# Patient Record
Sex: FEMALE | Race: WHITE | Employment: UNEMPLOYED | URBAN - METROPOLITAN AREA
[De-identification: names, ages, dates, MRNs, and addresses within clinical notes are randomized per-mention and may not be internally consistent; named-entity substitution may affect disease eponyms.]

---

## 2017-03-10 PROBLEM — I10 HTN (HYPERTENSION): Status: ACTIVE | Noted: 2017-03-10

## 2017-04-25 ENCOUNTER — HOSPITAL ENCOUNTER (OUTPATIENT)
Dept: GENERAL RADIOLOGY | Age: 57
Discharge: HOME | End: 2017-04-25
Attending: INTERNAL MEDICINE | Admitting: INTERNAL MEDICINE

## 2017-04-25 ENCOUNTER — HOSPITAL ENCOUNTER (OUTPATIENT)
Dept: GENERAL RADIOLOGY | Age: 57
Discharge: HOME | End: 2017-04-25
Attending: FAMILY MEDICINE | Admitting: FAMILY MEDICINE

## 2017-04-25 LAB
ALBUMIN SERPL-MCNC: 4.3 GM/DL (ref 3.4–5)
ANION GAP SERPL CALCULATED.3IONS-SCNC: 19 MMOL/L (ref 4–16)
BASOPHILS ABSOLUTE: 0 K/CU MM
BASOPHILS RELATIVE PERCENT: 0.5 % (ref 0–1)
BUN BLDV-MCNC: 99 MG/DL (ref 6–23)
CALCIUM SERPL-MCNC: 9.9 MG/DL (ref 8.3–10.6)
CHLORIDE BLD-SCNC: 102 MMOL/L (ref 99–110)
CO2: 21 MMOL/L (ref 21–32)
CREAT SERPL-MCNC: 6.2 MG/DL (ref 0.6–1.1)
DIFFERENTIAL TYPE: ABNORMAL
EOSINOPHILS ABSOLUTE: 0.1 K/CU MM
EOSINOPHILS RELATIVE PERCENT: 2.7 % (ref 0–3)
GFR AFRICAN AMERICAN: 8 ML/MIN/1.73M2
GFR NON-AFRICAN AMERICAN: 7 ML/MIN/1.73M2
GLUCOSE BLD-MCNC: 88 MG/DL (ref 70–140)
HCT VFR BLD CALC: 35.7 % (ref 37–47)
HEMOGLOBIN: 10.4 GM/DL (ref 12.5–16)
IMMATURE NEUTROPHIL %: 0.2 % (ref 0–0.43)
LYMPHOCYTES ABSOLUTE: 1.2 K/CU MM
LYMPHOCYTES RELATIVE PERCENT: 29.8 % (ref 24–44)
MCH RBC QN AUTO: 26.3 PG (ref 27–31)
MCHC RBC AUTO-ENTMCNC: 29.1 % (ref 32–36)
MCV RBC AUTO: 90.4 FL (ref 78–100)
MONOCYTES ABSOLUTE: 0.3 K/CU MM
MONOCYTES RELATIVE PERCENT: 7 % (ref 0–4)
NUCLEATED RBC %: 0 %
PDW BLD-RTO: 17.2 % (ref 11.7–14.9)
PHOSPHORUS: 5.1 MG/DL (ref 2.5–4.9)
PLATELET # BLD: 152 K/CU MM (ref 140–440)
PMV BLD AUTO: 9.2 FL (ref 7.5–11.1)
POTASSIUM SERPL-SCNC: 4.7 MMOL/L (ref 3.5–5.1)
RBC # BLD: 3.95 M/CU MM (ref 4.2–5.4)
SEGMENTED NEUTROPHILS ABSOLUTE COUNT: 2.5 K/CU MM
SEGMENTED NEUTROPHILS RELATIVE PERCENT: 59.8 % (ref 36–66)
SODIUM BLD-SCNC: 142 MMOL/L (ref 135–145)
TOTAL IMMATURE NEUTOROPHIL: 0.01 K/CU MM
TOTAL NUCLEATED RBC: 0 K/CU MM
TSH HIGH SENSITIVITY: 0.43 UIU/ML (ref 0.27–4.2)
WBC # BLD: 4.1 K/CU MM (ref 4–10.5)

## 2017-05-01 PROBLEM — E83.39 HYPERPHOSPHATEMIA: Status: ACTIVE | Noted: 2017-05-01

## 2017-07-06 ENCOUNTER — HOSPITAL ENCOUNTER (OUTPATIENT)
Dept: GENERAL RADIOLOGY | Age: 57
Discharge: HOME | End: 2017-07-06
Attending: INTERNAL MEDICINE | Admitting: INTERNAL MEDICINE

## 2017-07-06 LAB
ALBUMIN SERPL-MCNC: 3.8 GM/DL (ref 3.4–5)
ALP BLD-CCNC: 107 IU/L (ref 40–128)
ALT SERPL-CCNC: 15 U/L (ref 10–40)
ANION GAP SERPL CALCULATED.3IONS-SCNC: 18 MMOL/L (ref 4–16)
AST SERPL-CCNC: 18 IU/L (ref 15–37)
BASOPHILS ABSOLUTE: 0 K/CU MM
BASOPHILS RELATIVE PERCENT: 0.4 % (ref 0–1)
BILIRUB SERPL-MCNC: 0.4 MG/DL (ref 0–1)
BUN BLDV-MCNC: 94 MG/DL (ref 6–23)
CALCIUM SERPL-MCNC: 9.4 MG/DL (ref 8.3–10.6)
CHLORIDE BLD-SCNC: 98 MMOL/L (ref 99–110)
CO2: 22 MMOL/L (ref 21–32)
CREAT SERPL-MCNC: 7.3 MG/DL (ref 0.6–1.1)
DIFFERENTIAL TYPE: ABNORMAL
EOSINOPHILS ABSOLUTE: 0.2 K/CU MM
EOSINOPHILS RELATIVE PERCENT: 3.1 % (ref 0–3)
GFR AFRICAN AMERICAN: 7 ML/MIN/1.73M2
GFR NON-AFRICAN AMERICAN: 6 ML/MIN/1.73M2
GLUCOSE BLD-MCNC: 206 MG/DL (ref 70–140)
HCT VFR BLD CALC: 31.8 % (ref 37–47)
HEMOGLOBIN: 9.5 GM/DL (ref 12.5–16)
IMMATURE NEUTROPHIL %: 0.4 % (ref 0–0.43)
LYMPHOCYTES ABSOLUTE: 1 K/CU MM
LYMPHOCYTES RELATIVE PERCENT: 18.8 % (ref 24–44)
MCH RBC QN AUTO: 26.8 PG (ref 27–31)
MCHC RBC AUTO-ENTMCNC: 29.9 % (ref 32–36)
MCV RBC AUTO: 89.8 FL (ref 78–100)
MONOCYTES ABSOLUTE: 0.4 K/CU MM
MONOCYTES RELATIVE PERCENT: 6.3 % (ref 0–4)
NUCLEATED RBC %: 0 %
PDW BLD-RTO: 18.5 % (ref 11.7–14.9)
PHOSPHORUS: 5 MG/DL (ref 2.5–4.9)
PLATELET # BLD: 163 K/CU MM (ref 140–440)
PMV BLD AUTO: 9.5 FL (ref 7.5–11.1)
POTASSIUM SERPL-SCNC: 5.2 MMOL/L (ref 3.5–5.1)
RBC # BLD: 3.54 M/CU MM (ref 4.2–5.4)
SEGMENTED NEUTROPHILS ABSOLUTE COUNT: 3.9 K/CU MM
SEGMENTED NEUTROPHILS RELATIVE PERCENT: 71 % (ref 36–66)
SODIUM BLD-SCNC: 138 MMOL/L (ref 135–145)
TOTAL IMMATURE NEUTOROPHIL: 0.02 K/CU MM
TOTAL NUCLEATED RBC: 0 K/CU MM
TOTAL PROTEIN: 7.3 GM/DL (ref 6.4–8.2)
WBC # BLD: 5.5 K/CU MM (ref 4–10.5)

## 2017-07-21 ENCOUNTER — HOSPITAL ENCOUNTER (OUTPATIENT)
Dept: GENERAL RADIOLOGY | Age: 57
Discharge: HOME | End: 2017-07-21
Attending: INTERNAL MEDICINE | Admitting: INTERNAL MEDICINE

## 2017-07-21 DIAGNOSIS — R06.02 BREATH SHORTNESS: ICD-10-CM

## 2017-07-21 LAB
HBV SURFACE AB TITR SER: <3.5 {TITER}
HEPATITIS B CORE IGM ANTIBODY: NON REACTIVE
HEPATITIS B SURFACE ANTIGEN: NON REACTIVE

## 2017-12-06 ENCOUNTER — HOSPITAL ENCOUNTER (OUTPATIENT)
Dept: OTHER | Age: 57
Discharge: HOME | End: 2017-12-06
Attending: INTERNAL MEDICINE | Admitting: INTERNAL MEDICINE

## 2017-12-08 LAB
HAV AB SERPL IA-ACNC: POSITIVE
MS ALPHA-FETOPROTEIN: 4

## 2017-12-11 ENCOUNTER — HOSPITAL ENCOUNTER (OUTPATIENT)
Dept: OTHER | Age: 57
Discharge: HOME | End: 2017-12-11
Attending: INTERNAL MEDICINE | Admitting: INTERNAL MEDICINE

## 2017-12-11 LAB
ANION GAP SERPL CALCULATED.3IONS-SCNC: 16 MMOL/L (ref 4–16)
BUN BLDV-MCNC: 52 MG/DL (ref 6–23)
CALCIUM SERPL-MCNC: 8.8 MG/DL (ref 8.3–10.6)
CHLORIDE BLD-SCNC: 99 MMOL/L (ref 99–110)
CO2: 23 MMOL/L (ref 21–32)
CREAT SERPL-MCNC: 5.7 MG/DL (ref 0.6–1.1)
GFR AFRICAN AMERICAN: 9 ML/MIN/1.73M2
GFR NON-AFRICAN AMERICAN: 8 ML/MIN/1.73M2
GLUCOSE BLD-MCNC: 243 MG/DL (ref 70–99)
POTASSIUM SERPL-SCNC: 4.1 MMOL/L (ref 3.5–5.1)
SODIUM BLD-SCNC: 138 MMOL/L (ref 135–145)

## 2018-05-16 ENCOUNTER — HOSPITAL ENCOUNTER (OUTPATIENT)
Dept: OTHER | Age: 58
Discharge: HOME | End: 2018-05-16
Attending: INTERNAL MEDICINE | Admitting: INTERNAL MEDICINE

## 2018-05-16 LAB — APTT: 52 SECONDS (ref 21.2–33)

## 2018-06-06 ENCOUNTER — HOSPITAL ENCOUNTER (OUTPATIENT)
Dept: OTHER | Age: 58
Discharge: HOME | End: 2018-06-06
Attending: INTERNAL MEDICINE | Admitting: INTERNAL MEDICINE

## 2018-06-06 LAB — APTT: 56.9 SECONDS (ref 21.2–33)

## 2018-07-02 ENCOUNTER — HOSPITAL ENCOUNTER (OUTPATIENT)
Dept: OTHER | Age: 58
Discharge: HOME | End: 2018-07-02
Attending: INTERNAL MEDICINE | Admitting: INTERNAL MEDICINE

## 2018-07-02 LAB — APTT: 56.8 SECONDS (ref 21.2–33)

## 2018-08-01 ENCOUNTER — HOSPITAL ENCOUNTER (OUTPATIENT)
Dept: OTHER | Age: 58
Discharge: HOME | End: 2018-08-01
Attending: INTERNAL MEDICINE | Admitting: INTERNAL MEDICINE

## 2018-08-01 LAB — APTT: 54.5 SECONDS (ref 21.2–33)

## 2018-11-05 PROBLEM — Z94.89 TRANSPLANT RECIPIENT: Status: ACTIVE | Noted: 2018-11-05

## 2018-11-05 PROBLEM — Z94.4 LIVER TRANSPLANT STATUS (HCC): Status: ACTIVE | Noted: 2018-11-05

## 2018-11-05 PROBLEM — Z94.0 KIDNEY TRANSPLANT STATUS: Status: ACTIVE | Noted: 2018-11-05

## 2018-11-05 PROBLEM — R73.9 HYPERGLYCEMIA: Status: ACTIVE | Noted: 2018-11-05

## 2018-11-05 PROBLEM — N18.1 CHRONIC KIDNEY DISEASE (CKD) STAGE G1/A1, GLOMERULAR FILTRATION RATE (GFR) EQUAL TO OR GREATER THAN 90 ML/MIN/1.73 SQUARE METER AND ALBUMINURIA CREATININE RATIO LESS THAN 30 MG/G: Status: ACTIVE | Noted: 2018-11-05

## 2019-02-05 ENCOUNTER — HOSPITAL ENCOUNTER (OUTPATIENT)
Dept: PHYSICAL THERAPY | Age: 59
Setting detail: THERAPIES SERIES
Discharge: HOME | End: 2019-02-05
Payer: COMMERCIAL

## 2019-02-05 PROCEDURE — 97162 PT EVAL MOD COMPLEX 30 MIN: CPT

## 2019-02-05 PROCEDURE — 97110 THERAPEUTIC EXERCISES: CPT

## 2019-02-05 ASSESSMENT — PAIN SCALES - GENERAL: PAINLEVEL_OUTOF10: 3

## 2019-02-05 ASSESSMENT — PAIN DESCRIPTION - FREQUENCY: FREQUENCY: CONTINUOUS

## 2019-02-05 ASSESSMENT — PAIN DESCRIPTION - LOCATION: LOCATION: BACK

## 2019-02-05 ASSESSMENT — PAIN DESCRIPTION - DESCRIPTORS: DESCRIPTORS: ACHING;SHARP;TIGHTNESS

## 2019-02-05 ASSESSMENT — PAIN DESCRIPTION - PAIN TYPE: TYPE: CHRONIC PAIN

## 2019-02-11 ENCOUNTER — HOSPITAL ENCOUNTER (OUTPATIENT)
Dept: PHYSICAL THERAPY | Age: 59
Setting detail: THERAPIES SERIES
Discharge: HOME | End: 2019-02-11
Payer: COMMERCIAL

## 2019-02-11 PROCEDURE — 97110 THERAPEUTIC EXERCISES: CPT

## 2019-02-11 PROCEDURE — G0283 ELEC STIM OTHER THAN WOUND: HCPCS

## 2019-02-14 ENCOUNTER — HOSPITAL ENCOUNTER (OUTPATIENT)
Dept: PHYSICAL THERAPY | Age: 59
Discharge: HOME | End: 2019-02-14

## 2019-02-14 NOTE — FLOWSHEET NOTE
Physical Therapy  Cancellation/No-show Note  Patient Name:  Julia Robertson  :  1960   Date:  2019  Cancelled visits to date: 1  No-shows to date: 0    For today's appointment patient:  [x]  Cancelled  []  Rescheduled appointment  []  No-show     Reason given by patient:  []  Patient ill  []  Conflicting appointment  []  No transportation    []  Conflict with work  []  No reason given  [x]  Other:     Comments:  Over slept and won't make it in time.     Electronically signed by:  Faustina Valencia PTA, 2019, 11:05 AM

## 2019-02-18 ENCOUNTER — HOSPITAL ENCOUNTER (OUTPATIENT)
Dept: PHYSICAL THERAPY | Age: 59
Setting detail: THERAPIES SERIES
Discharge: HOME | End: 2019-02-18
Payer: COMMERCIAL

## 2019-02-18 PROCEDURE — G0283 ELEC STIM OTHER THAN WOUND: HCPCS

## 2019-02-18 PROCEDURE — 97110 THERAPEUTIC EXERCISES: CPT

## 2019-02-21 ENCOUNTER — HOSPITAL ENCOUNTER (OUTPATIENT)
Dept: PHYSICAL THERAPY | Age: 59
Setting detail: THERAPIES SERIES
Discharge: HOME | End: 2019-02-21
Payer: COMMERCIAL

## 2019-02-21 PROCEDURE — G0283 ELEC STIM OTHER THAN WOUND: HCPCS

## 2019-02-21 PROCEDURE — 97110 THERAPEUTIC EXERCISES: CPT

## 2019-02-21 PROCEDURE — 97112 NEUROMUSCULAR REEDUCATION: CPT

## 2019-02-25 ENCOUNTER — HOSPITAL ENCOUNTER (OUTPATIENT)
Dept: PHYSICAL THERAPY | Age: 59
Setting detail: THERAPIES SERIES
Discharge: HOME | End: 2019-02-25
Payer: COMMERCIAL

## 2019-02-25 PROCEDURE — G0283 ELEC STIM OTHER THAN WOUND: HCPCS

## 2019-02-25 PROCEDURE — 97112 NEUROMUSCULAR REEDUCATION: CPT

## 2019-02-25 PROCEDURE — 97110 THERAPEUTIC EXERCISES: CPT

## 2019-02-28 ENCOUNTER — HOSPITAL ENCOUNTER (OUTPATIENT)
Dept: PHYSICAL THERAPY | Age: 59
Setting detail: THERAPIES SERIES
Discharge: HOME | End: 2019-02-28
Payer: COMMERCIAL

## 2019-02-28 PROCEDURE — 97035 APP MDLTY 1+ULTRASOUND EA 15: CPT

## 2019-02-28 PROCEDURE — 97140 MANUAL THERAPY 1/> REGIONS: CPT

## 2019-02-28 PROCEDURE — 97110 THERAPEUTIC EXERCISES: CPT

## 2019-03-04 ENCOUNTER — HOSPITAL ENCOUNTER (OUTPATIENT)
Dept: PHYSICAL THERAPY | Age: 59
Setting detail: THERAPIES SERIES
Discharge: HOME | End: 2019-03-04
Payer: COMMERCIAL

## 2019-03-04 PROBLEM — E11.9 DM (DIABETES MELLITUS) (HCC): Status: ACTIVE | Noted: 2019-03-04

## 2019-03-04 PROCEDURE — 97110 THERAPEUTIC EXERCISES: CPT

## 2019-03-04 PROCEDURE — 97140 MANUAL THERAPY 1/> REGIONS: CPT

## 2019-03-04 PROCEDURE — 97035 APP MDLTY 1+ULTRASOUND EA 15: CPT

## 2019-03-07 ENCOUNTER — HOSPITAL ENCOUNTER (OUTPATIENT)
Dept: PHYSICAL THERAPY | Age: 59
Discharge: HOME | End: 2019-03-07

## 2019-03-07 NOTE — FLOWSHEET NOTE
Physical Therapy  Cancellation/No-show Note  Patient Name:  Mimi Burns  :  1960   Date:  3/7/2019  Cancelled visits to date: 1  No-shows to date: 0    For today's appointment patient:  [x]  Cancelled  []  Rescheduled appointment  []  No-show     Reason given by patient:  []  Patient ill  [x]  Conflicting appointment  []  No transportation    []  Conflict with work  []  No reason given  []  Other:     Comments:      Electronically signed by:  Keri Tijerina PTA, 3/7/2019, 10:55 AM

## 2019-03-11 ENCOUNTER — HOSPITAL ENCOUNTER (OUTPATIENT)
Dept: PHYSICAL THERAPY | Age: 59
Discharge: HOME | End: 2019-03-11

## 2019-03-14 ENCOUNTER — HOSPITAL ENCOUNTER (OUTPATIENT)
Dept: PHYSICAL THERAPY | Age: 59
Discharge: HOME | End: 2019-03-14

## 2019-03-14 NOTE — FLOWSHEET NOTE
Paulina Parada   Physical Therapist   Cancelled   Flowsheet Note   Signed   Date of Service:  3/7/2019 10:55 AM          Related encounter: PT TREATMENT 45 MINUTES from 3/7/2019 in [place] [address] Physical Therapy         Signed                 Show:Clear all  []Manual[x]Template[x]Copied    Added by:  [x]Evie Beth      []Ying for details            Physical Therapy  Cancellation/No-show Note  Patient Name:  Mimi Billsr  :  1960   Date:  3/7/2019  Cancelled visits to date: 3  No-shows to date: 0     For today's appointment patient:  [x]  Cancelled  []  Rescheduled appointment  []  No-show     Reason given by patient:  []  Patient ill  []  Conflicting appointment  []  No transportation                  []  Conflict with work  [x]  No reason given  []  Other:                Comments:       Electronically signed by:  Abimbola Beavers

## 2019-03-26 ENCOUNTER — HOSPITAL ENCOUNTER (OUTPATIENT)
Dept: PHYSICAL THERAPY | Age: 59
Setting detail: THERAPIES SERIES
Discharge: HOME | End: 2019-03-26
Payer: COMMERCIAL

## 2019-03-26 PROCEDURE — G0283 ELEC STIM OTHER THAN WOUND: HCPCS

## 2019-03-26 PROCEDURE — 97112 NEUROMUSCULAR REEDUCATION: CPT

## 2019-03-26 PROCEDURE — 97110 THERAPEUTIC EXERCISES: CPT

## 2019-03-28 ENCOUNTER — HOSPITAL ENCOUNTER (OUTPATIENT)
Dept: PHYSICAL THERAPY | Age: 59
Setting detail: THERAPIES SERIES
Discharge: HOME | End: 2019-03-28
Payer: COMMERCIAL

## 2019-03-28 PROCEDURE — 97112 NEUROMUSCULAR REEDUCATION: CPT

## 2019-03-28 PROCEDURE — 97110 THERAPEUTIC EXERCISES: CPT

## 2019-03-28 PROCEDURE — G0283 ELEC STIM OTHER THAN WOUND: HCPCS

## 2019-04-01 LAB
CHOLESTEROL, TOTAL: 149 MG/DL
CHOLESTEROL/HDL RATIO: NORMAL
HDLC SERPL-MCNC: 52 MG/DL (ref 35–70)
LDL CHOLESTEROL CALCULATED: 65 MG/DL (ref 0–160)
TRIGL SERPL-MCNC: 162 MG/DL
VLDLC SERPL CALC-MCNC: NORMAL MG/DL

## 2019-04-08 ENCOUNTER — HOSPITAL ENCOUNTER (OUTPATIENT)
Dept: PHYSICAL THERAPY | Age: 59
Setting detail: THERAPIES SERIES
Discharge: HOME | End: 2019-04-08
Payer: COMMERCIAL

## 2019-04-08 PROCEDURE — 97110 THERAPEUTIC EXERCISES: CPT

## 2019-04-08 NOTE — FLOWSHEET NOTE
Outpatient Physical Therapy  [place]           [x] Phone: [phone]   Fax: [phone]  [facility] [place]           [] Phone: [phone]   Fax: [phone]        Physical Therapy Daily Treatment Note  Date:  2019    Patient Name:  Mimi Painter    :  1960  MRN: [id number]  Restrictions/Precautions: Other position/activity restrictions: No formal restrictions. NO LUMBAR MECHANICAL TRACTION D/T RECENT KIDNEY/LIVER TRANSPLANT  Diagnosis:   Diagnosis: Low back pain  Date of Injury/Surgery:  chronic  Treatment Diagnosis: Treatment Diagnosis: core and postural weakness, back pain    Insurance/Certification information: PT Insurance Information: Denise   Referring Physician:  Referring Practitioner: Meagan Lyn  Next Doctor Visit:  uncertain  Plan of care signed (Y/N):  Y  Outcome Measure: Osw Eval , 10th visit   Visit# / total visits:  10/12   Pain level: 1/10 back; abdomen 2/10  POC Date Range:   19 - 19 (date extended for purposes of completing POC/had missed visits.)       Goals:          Long term goals  Time Frame for Long term goals : In 6 weeks, patient will  Long term goal 1: demonstrate compliance and independence w/HEP. - inconsistent 19  Long term goal 2: score 1-9 on Oswestry LBPDQ as indication of significant functional improvement. - Improved/Unmet 19  Long term goal 3: report ability to sleep at least 4 hrs/night w/o waking w/back pain. - Met 3/26/19  Long term goal 4: report ability to roll over in bed w/o having LBP. Improved/Unmet 19    Summary of Evaluation: Assessment: Patient is a 59 y.o. female w/DX LBP and DDD. Pt reports hx LBP, however uncertain if it was related to PKD, liver and kidney failure which caused additional weight gain. Transplant was completed in 2018 and continues to have back pain. Pain is worse as the day goes on and turning over in bed, bending forward.   Today, patient presents w/core weakness, palpable tenderness over lumbar spine and right paraspinals, general deconditioning and will benefit from physical therapy. Subjective:  Pt has been busy doing her taxes; missed her last appointment. Inconsistent w/HEP. Overall, feels back is improving. Any changes in Ambulatory Summary Sheet? None    Objective:  No back pain, mild abdominal soreness  Gait is non-antalgic  Mild LBP discomfort w/exercise      Exercises: (No more than 4 columns)   Exercise/Equipment 2/28/19 #6 3/4/2019 (7) 3/26/19 (8) 3/28/19 (9) 4/08/19 (10)         Mod Osw next visit Mod Osw  11/50 (22%)   WARM UP                           TABLE Hold d/t abdominal discomfort from procedure yesterday.  HOLD Lumbar stabilization:    SB: pelvic rocks f/b;s/s;cw/ccw x 1-2 min ea    SB: marching x 1 min    SB  Shoulder flexion w/1# hand wts  2 x 10    SB:  DKTC with  OH arms holding a 2# MB 3 ct 2 x 10    Bridging:   3 ct 2 x 10    Hook lying trunk rotation  3 x 10    SKTC  3 x 20 sec holds Lumbar stabilization:    SB: pelvic rocks f/b;s/s;cw/ccw x 1-2 min ea    SB: marching x 1 min    SB  Shoulder flexion w/2# hand wts  2 x 10    SB:  DKTC with  OH arms holding a 2# MB 3 ct 2 x 10    Bridging w/red TB outward resistance at knees   3 ct 2 x 10    Hook lying trunk rotation  3 x 10    SKTC  3 x 20 sec holds    Supine clamshell #1 w/RTB  2 x 10 Lumbar stabilization:    SB: pelvic rocks f/b;s/s;cw/ccw x 1-2 min ea    SB: marching x 1 min    SB  Shoulder flexion w/2# hand wts  2 x 15    SB  Shoulder forward punch  W/2# hand wt  2 x 15    SB:  DKTC with  OH arms holding a 2# MB 3 ct 2 x 10    SB:  DKTC w/OH arms holding 2# MB w/oblique rotation  1 x 10    Bridging w/red TB outward resistance at knees   3 ct 2 x 10    Hook lying trunk rotation  3 x 10    SKTC  3 x 20 sec holds    Supine clamshell #1 w/RTB  3 x 10    Nu Step WL4 x 7 min S7/A7  fatigue Nu Step WL3 x 10 min S7/A7 Nu Step WL3 x 10 min S7/A7 Nu Step WL3 x 10 min S7/A7 Nu Step WL3 x 10 min S7/A7 Electronically signed by:  Ezequiel Aceves, PT 4/8/2019, 3:51 PM

## 2019-04-10 ENCOUNTER — HOSPITAL ENCOUNTER (OUTPATIENT)
Dept: PHYSICAL THERAPY | Age: 59
Setting detail: THERAPIES SERIES
Discharge: HOME | End: 2019-04-10
Payer: COMMERCIAL

## 2019-04-10 PROCEDURE — 97110 THERAPEUTIC EXERCISES: CPT

## 2019-04-10 NOTE — FLOWSHEET NOTE
Outpatient Physical Therapy  [place]           [x] Phone: [phone]   Fax: [phone]  [facility]           [] Phone: [phone]   Fax: [phone]        Physical Therapy Daily Treatment Note  Date:  4/10/2019    Patient Name:  Mimi Painter    :  1960  MRN: [id number]  Restrictions/Precautions: Other position/activity restrictions: No formal restrictions. NO LUMBAR MECHANICAL TRACTION D/T RECENT KIDNEY/LIVER TRANSPLANT  Diagnosis:   Diagnosis: Low back pain  Date of Injury/Surgery:  chronic  Treatment Diagnosis: Treatment Diagnosis: core and postural weakness, back pain    Insurance/Certification information: PT Insurance Information: Denise   Referring Physician:  Referring Practitioner: Meagan Lyn  Next Doctor Visit:  uncertain  Plan of care signed (Y/N):  Y  Outcome Measure: Osw Eval , 10th visit   Visit# / total visits:     Pain level: 1/10 back; abdomen 2-3/10  POC Date Range:   19 - 19 (date extended for purposes of completing POC/had missed visits.)       Goals:          Long term goals  Time Frame for Long term goals : In 6 weeks, patient will  Long term goal 1: demonstrate compliance and independence w/HEP. - inconsistent 19  Long term goal 2: score 1-9 on Oswestry LBPDQ as indication of significant functional improvement. - Improved/Unmet 19  Long term goal 3: report ability to sleep at least 4 hrs/night w/o waking w/back pain. - Met 3/26/19  Long term goal 4: report ability to roll over in bed w/o having LBP. Improved/Unmet 19    Summary of Evaluation: Assessment: Patient is a 59 y.o. female w/DX LBP and DDD. Pt reports hx LBP, however uncertain if it was related to PKD, liver and kidney failure which caused additional weight gain. Transplant was completed in 2018 and continues to have back pain. Pain is worse as the day goes on and turning over in bed, bending forward.   Today, patient presents w/core weakness, palpable tenderness over lumbar spine and right paraspinals, general deconditioning and will benefit from physical therapy. Subjective:  Pt states she continues to have discomfort with the therapy and if she coughs or sneezes. Any changes in Ambulatory Summary Sheet? None    Objective:  Pt with a slight increase in abdominal pain with the treatment. Transfers from supine to sit are still uncomfortable.         Exercises: (No more than 4 columns)   Exercise/Equipment 3/26/19 (8) 3/28/19 (9) 4/08/19 (10) 4/10/19  #11       Mod Osw next visit Mod Osw  11/50 (22%)    WARM UP                        TABLE Lumbar stabilization:    SB: pelvic rocks f/b;s/s;cw/ccw x 1-2 min ea    SB: marching x 1 min    SB  Shoulder flexion w/1# hand wts  2 x 10    SB:  DKTC with  OH arms holding a 2# MB 3 ct 2 x 10    Bridging:   3 ct 2 x 10    Hook lying trunk rotation  3 x 10    SKTC  3 x 20 sec holds Lumbar stabilization:    SB: pelvic rocks f/b;s/s;cw/ccw x 1-2 min ea    SB: marching x 1 min    SB  Shoulder flexion w/2# hand wts  2 x 10    SB:  DKTC with  OH arms holding a 2# MB 3 ct 2 x 10    Bridging w/red TB outward resistance at knees   3 ct 2 x 10    Hook lying trunk rotation  3 x 10    SKTC  3 x 20 sec holds    Supine clamshell #1 w/RTB  2 x 10 Lumbar stabilization:    SB: pelvic rocks f/b;s/s;cw/ccw x 1-2 min ea    SB: marching x 1 min    SB  Shoulder flexion w/2# hand wts  2 x 15    SB  Shoulder forward punch  W/2# hand wt  2 x 15    SB:  DKTC with  OH arms holding a 2# MB 3 ct 2 x 10    SB:  DKTC w/OH arms holding 2# MB w/oblique rotation  1 x 10    Bridging w/red TB outward resistance at knees   3 ct 2 x 10    Hook lying trunk rotation  3 x 10    SKTC  3 x 20 sec holds    Supine clamshell #1 w/RTB  3 x 10 Lumbar stabilization:    SB: pelvic rocks f/b;s/s;cw/ccw x 1 min ea    SB: marching x 1 min    SB  Shoulder flexion w/2# hand wts  2 x 15    SB  Shoulder forward punch  W/2# hand wt  2 x 15    SB:  DKTC with  OH arms holding a 2# MB 3 ct 2 x 10    SB:  DKTC w/OH arms holding 2# MB w/oblique rotation  1 x 10    Bridging w/red TB outward resistance at knees   3 ct 2 x 10    Nu Step WL3 x 10 min S7/A7 Nu Step WL3 x 10 min S7/A7 Nu Step WL3 x 10 min S7/A7 Nu Step WL3 x 10 min S7/A7   Shuttle    2 cords 2 x 15                           STANDING       Pallof press with lunge    2 x 10 YTB R/L                                                  PROPRIOCEPTION                                          MODALITIES IFC with HP to Pt's matthew on B LB, Pt in prone  Output 22  15 min  MH applied to LB IFC with HP to Pt's matthew on B LB, Pt in prone  Output 22  15 min  MH applied to LB                       Other Therapeutic Activities/Education:    Resume HEP as tolerated w/consideration for recent biliary stent removal.    Home Exercise Program:    PPT  Hook lying trunk rotation  SKTC as able  Bridges w/RTB  Supine clamshell #1 w/RTB    Manual Treatments:    N/A    Modalities:  US (see above)      Communication with other providers:    N/A    Assessment:  Pt still with more abdominal pain post treatment to 3/10. Plan for Next Session:   Progress lumbar stabilization/core strengthening as tolerated. Clamshell #1/#2  Shuttle  pallof press w/lunge. Advance standing functional strengthening as able. STM/modalities prn pain management. Time In / Time Out:  1119- 1204        Timed Code/Total Treatment Minutes:  39' TE/ 45'      Next Progress Note due:  Every 10 visits.     Plan of Care Interventions:  [x] Therapeutic Exercise  [x] Modalities:  [] Therapeutic Activity      [] Ultrasound  [x] Estim  [] Gait Training      [] Cervical Traction [] Lumbar Traction  [x] Neuromuscular Re-education    [x] Cold/hotpack [] Iontophoresis   [x] Instruction in HEP      [] Vasopneumatic   [] Dry Needling    [] Manual Therapy               [] Aquatic Therapy              Electronically signed by:  Kathryn Hoff PTA 4/10/2019, 11:24 AM

## 2019-04-15 LAB
ALBUMIN SERPL-MCNC: NORMAL G/DL
ALP BLD-CCNC: NORMAL U/L
ALT SERPL-CCNC: NORMAL U/L
ANION GAP SERPL CALCULATED.3IONS-SCNC: NORMAL MMOL/L
AST SERPL-CCNC: NORMAL U/L
BILIRUB SERPL-MCNC: 1.4 MG/DL (ref 0.1–1.4)
BUN BLDV-MCNC: NORMAL MG/DL
CALCIUM SERPL-MCNC: NORMAL MG/DL
CHLORIDE BLD-SCNC: NORMAL MMOL/L
CO2: NORMAL MMOL/L
CREAT SERPL-MCNC: NORMAL MG/DL
GFR CALCULATED: NORMAL
GLUCOSE BLD-MCNC: NORMAL MG/DL
POTASSIUM SERPL-SCNC: NORMAL MMOL/L
SODIUM BLD-SCNC: NORMAL MMOL/L
TOTAL PROTEIN: NORMAL

## 2019-04-16 ENCOUNTER — HOSPITAL ENCOUNTER (OUTPATIENT)
Dept: PHYSICAL THERAPY | Age: 59
Setting detail: THERAPIES SERIES
Discharge: HOME | End: 2019-04-16
Payer: COMMERCIAL

## 2019-04-16 PROCEDURE — 97110 THERAPEUTIC EXERCISES: CPT

## 2019-04-16 NOTE — FLOWSHEET NOTE
Outpatient Physical Therapy  [place]           [x] Phone: [phone]   Fax: [phone]  [facility]           [] Phone: [phone]   Fax: [phone]        Physical Therapy Daily Treatment Note  Date:  2019    Patient Name:  Britney Officer    :  1960  MRN: [id number]  Restrictions/Precautions: Other position/activity restrictions: No formal restrictions. NO LUMBAR MECHANICAL TRACTION D/T RECENT KIDNEY/LIVER TRANSPLANT  Diagnosis:   Diagnosis: Low back pain  Date of Injury/Surgery:  chronic  Treatment Diagnosis: Treatment Diagnosis: core and postural weakness, back pain    Insurance/Certification information: PT Insurance Information: Denise   Referring Physician:  Referring Practitioner: Jamie Looney  Next Doctor Visit:  uncertain  Plan of care signed (Y/N):  Y  Outcome Measure: Osw Eval , 10th visit   Visit# / total visits:     Pain level: 0/10 back; abdomen  3/10  POC Date Range:   19 - 19 (date extended for purposes of completing POC/had missed visits.)       Goals:          Long term goals  Time Frame for Long term goals : In 6 weeks, patient will  Long term goal 1: demonstrate compliance and independence w/HEP. - inconsistent 19; 19 still reports she is inconsistent  Long term goal 2: score 1-9 on Oswestry LBPDQ as indication of significant functional improvement. - Improved/Unmet 19  Long term goal 3: report ability to sleep at least 4 hrs/night w/o waking w/back pain. - Met 3/26/19;  19 Doesn't wake due to back pain. Long term goal 4: report ability to roll over in bed w/o having LBP. Improved/Unmet 19;  19  Still has some pain but not as severe. Summary of Evaluation: Assessment: Patient is a 59 y.o. female w/DX LBP and DDD. Pt reports hx LBP, however uncertain if it was related to PKD, liver and kidney failure which caused additional weight gain.   Transplant was completed in 2018 and continues to have back pain.  Pain is worse as the day goes on and turning over in bed, bending forward. Today, patient presents w/core weakness, palpable tenderness over lumbar spine and right paraspinals, general deconditioning and will benefit from physical therapy. 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 Any changes in Ambulatory Summary Sheet?   None      Objective:  Pt still has an increase in the abdomen with the therapy but not the LB        Exercises: (No more than 4 columns)   Exercise/Equipment 3/26/19 (8) 3/28/19 (9) 4/08/19 (10) 4/10/19  #11 4/16/19 #12       Mod Osw next visit Mod Osw  11/50 (22%)     WARM UP                           TABLE Lumbar stabilization:    SB: pelvic rocks f/b;s/s;cw/ccw x 1-2 min ea    SB: marching x 1 min    SB  Shoulder flexion w/1# hand wts  2 x 10    SB:  DKTC with  OH arms holding a 2# MB 3 ct 2 x 10    Bridging:   3 ct 2 x 10    Hook lying trunk rotation  3 x 10    SKTC  3 x 20 sec holds Lumbar stabilization:    SB: pelvic rocks f/b;s/s;cw/ccw x 1-2 min ea    SB: marching x 1 min    SB  Shoulder flexion w/2# hand wts  2 x 10    SB:  DKTC with  OH arms holding a 2# MB 3 ct 2 x 10    Bridging w/red TB outward resistance at knees   3 ct 2 x 10    Hook lying trunk rotation  3 x 10    SKTC  3 x 20 sec holds    Supine clamshell #1 w/RTB  2 x 10 Lumbar stabilization:    SB: pelvic rocks f/b;s/s;cw/ccw x 1-2 min ea    SB: marching x 1 min    SB  Shoulder flexion w/2# hand wts  2 x 15    SB  Shoulder forward punch  W/2# hand wt  2 x 15    SB:  DKTC with  OH arms holding a 2# MB 3 ct 2 x 10    SB:  DKTC w/OH arms holding 2# MB w/oblique rotation  1 x 10    Bridging w/red TB outward resistance at knees   3 ct 2 x 10    Hook lying trunk rotation  3 x 10    SKTC  3 x 20 sec holds    Supine clamshell #1 w/RTB  3 x 10 Lumbar stabilization:    SB: pelvic rocks f/b;s/s;cw/ccw x 1 min ea    SB: marching x 1 min    SB  Shoulder flexion w/2# hand wts  2 x 15    SB  Shoulder forward punch  W/2# hand wt  2 x 15    SB:  DKTC with  OH arms holding a 2# MB 3 ct 2 x 10    SB:  DKTC w/OH arms holding 2# MB w/oblique rotation  1 x 10    Bridging w/red TB outward resistance at knees   3 ct 2 x 10 Lumbar stabilization:    SB: pelvic rocks f/b;s/s;cw/ccw x 1 min ea    SB: marching x 1 min    SB  Shoulder flexion w/2# hand wts  2 x 15    SB  Shoulder forward punch  W/2# hand wt  2 x 15    SB:  DKTC with  OH arms holding a 2# MB 3 ct 2 x 15    SB:  DKTC w/OH arms holding 2# MB w/oblique rotation  1 x 15    Bridging w/red TB outward resistance at knees   3 ct 2 x 10    Nu Step WL3 x 10 min S7/A7 Nu Step WL3 x 10 min S7/A7 Nu Step WL3 x 10 min S7/A7 Nu Step WL3 x 10 min S7/A7 Scifit LV 1 x 10 min  S/A to comfort   Shuttle    2 cords 2 x 15                               STANDING        Pallof press with lunge    2 x 10 YTB R/L                                                         PROPRIOCEPTION                                                MODALITIES IFC with HP to Pt's matthew on B LB, Pt in prone  Output 22  15 min  MH applied to LB IFC with HP to Pt's matthew on B LB, Pt in prone  Output 22  15 min  MH applied to LB                          Other Therapeutic Activities/Education:    Resume HEP as tolerated w/consideration for recent biliary stent removal.    Home Exercise Program:    PPT  Hook lying trunk rotation  SKTC as able  Bridges w/RTB  Supine clamshell #1 w/RTB    Manual Treatments:    N/A    Modalities:  US (see above)      Communication with other providers:    N/A    Assessment:  Abdominal pain 4/10 post treatment    Plan for Next Session:   Discharge per primary PT      Time In / Time Out:  5019-0827      Timed Code/Total Treatment Minutes:  48' TE/ 50'      Next Progress Note due:  Every 10 visits.     Plan of Care Interventions:  [x] Therapeutic Exercise  [x] Modalities:  [] Therapeutic Activity      [] Ultrasound  [x] Estim  [] Gait Training      [] Cervical Traction [] Lumbar Traction  [x] Neuromuscular Re-education    [x] Cold/hotpack [] Iontophoresis   [x] Instruction in HEP      [] Vasopneumatic   [] Dry Needling    [] Manual Therapy               [] Aquatic Therapy              Electronically signed by:  Merlyn Mendieta PTA 4/16/2019, 10:23 AM

## 2019-04-30 RX ORDER — TIZANIDINE HYDROCHLORIDE 2 MG/1
1 CAPSULE, GELATIN COATED ORAL 2 TIMES DAILY PRN
Refills: 2 | COMMUNITY
Start: 2019-02-27 | End: 2019-09-03 | Stop reason: SDUPTHER

## 2019-05-01 ENCOUNTER — TELEPHONE (OUTPATIENT)
Dept: FAMILY MEDICINE CLINIC | Age: 59
End: 2019-05-01

## 2019-05-03 ENCOUNTER — TELEPHONE (OUTPATIENT)
Dept: FAMILY MEDICINE CLINIC | Age: 59
End: 2019-05-03

## 2019-05-03 NOTE — TELEPHONE ENCOUNTER
----- Message from Gene Vences sent at 5/3/2019  2:28 PM EDT -----  Contact: [facility]/ [address]  PR[facility] - CURRENT MED LIST SHOWS KIDNEY AND LIVER TRANSPLANT RECENTLY. REJECTION MEDS INTERACTS WITH [facility].    [phone]

## 2019-05-07 ENCOUNTER — TELEPHONE (OUTPATIENT)
Dept: FAMILY MEDICINE CLINIC | Age: 59
End: 2019-05-07

## 2019-05-07 NOTE — TELEPHONE ENCOUNTER
LEONELA    PT WANTED TO INFORM THAT HER TRANSPLANT TEAM DOES NOT WANT HER USING THE ACETAMINOPHEN CREAM   Electronically signed by Jeremias Ayala LPN on 9/1/2672 at 1:50 AM

## 2019-05-14 ENCOUNTER — TELEPHONE (OUTPATIENT)
Dept: FAMILY MEDICINE CLINIC | Age: 59
End: 2019-05-14

## 2019-05-14 NOTE — TELEPHONE ENCOUNTER
Spoke with pt re no longer giving prolia in the office. Pt states taht is fine because prolia conflicts with her other meds.    Electronically signed by Stiven Pitts LPN on 1/70/3995 at 3:86 PM

## 2019-07-01 ENCOUNTER — HOSPITAL ENCOUNTER (OUTPATIENT)
Age: 59
Discharge: HOME | End: 2019-07-01
Payer: COMMERCIAL

## 2019-07-01 LAB
ALBUMIN SERPL-MCNC: 4.6 GM/DL (ref 3.4–5)
ALT SERPL-CCNC: 11 U/L (ref 10–40)
AST SERPL-CCNC: 12 IU/L (ref 15–37)
BASOPHILS ABSOLUTE: 0 K/CU MM
BASOPHILS RELATIVE PERCENT: 0.3 % (ref 0–1)
BILIRUB SERPL-MCNC: 1.5 MG/DL (ref 0–1)
BILIRUBIN DIRECT: 0.3 MG/DL (ref 0–0.3)
BUN BLDV-MCNC: 21 MG/DL (ref 6–23)
CHLORIDE BLD-SCNC: 104 MMOL/L (ref 99–110)
CO2: 23 MMOL/L (ref 21–32)
CREAT SERPL-MCNC: 0.8 MG/DL (ref 0.6–1.1)
DIFFERENTIAL TYPE: ABNORMAL
EOSINOPHILS ABSOLUTE: 0.1 K/CU MM
EOSINOPHILS RELATIVE PERCENT: 1.3 % (ref 0–3)
GFR AFRICAN AMERICAN: >60 ML/MIN/1.73M2
GFR NON-AFRICAN AMERICAN: >60 ML/MIN/1.73M2
GLUCOSE BLD-MCNC: 150 MG/DL (ref 70–99)
HCT VFR BLD CALC: 39.2 % (ref 37–47)
HEMOGLOBIN: 11.1 GM/DL (ref 12.5–16)
IMMATURE NEUTROPHIL %: 0.5 % (ref 0–0.43)
LYMPHOCYTES ABSOLUTE: 1.9 K/CU MM
LYMPHOCYTES RELATIVE PERCENT: 30.4 % (ref 24–44)
MCH RBC QN AUTO: 31.2 PG (ref 27–31)
MCHC RBC AUTO-ENTMCNC: 28.3 % (ref 32–36)
MCV RBC AUTO: 110.1 FL (ref 78–100)
MONOCYTES ABSOLUTE: 0.4 K/CU MM
MONOCYTES RELATIVE PERCENT: 6 % (ref 0–4)
NUCLEATED RBC %: 0 %
PDW BLD-RTO: 13.4 % (ref 11.7–14.9)
PLATELET # BLD: 143 K/CU MM (ref 140–440)
PMV BLD AUTO: 10.2 FL (ref 7.5–11.1)
POTASSIUM SERPL-SCNC: 4.3 MMOL/L (ref 3.5–5.1)
RBC # BLD: 3.56 M/CU MM (ref 4.2–5.4)
SEGMENTED NEUTROPHILS ABSOLUTE COUNT: 3.8 K/CU MM
SEGMENTED NEUTROPHILS RELATIVE PERCENT: 61.5 % (ref 36–66)
SODIUM BLD-SCNC: 140 MMOL/L (ref 135–145)
TOTAL IMMATURE NEUTOROPHIL: 0.03 K/CU MM
TOTAL NUCLEATED RBC: 0 K/CU MM
WBC # BLD: 6.2 K/CU MM (ref 4–10.5)

## 2019-07-01 PROCEDURE — 82248 BILIRUBIN DIRECT: CPT

## 2019-07-01 PROCEDURE — 84520 ASSAY OF UREA NITROGEN: CPT

## 2019-07-01 PROCEDURE — 82247 BILIRUBIN TOTAL: CPT

## 2019-07-01 PROCEDURE — 82040 ASSAY OF SERUM ALBUMIN: CPT

## 2019-07-01 PROCEDURE — 85025 COMPLETE CBC W/AUTO DIFF WBC: CPT

## 2019-07-01 PROCEDURE — 84460 ALANINE AMINO (ALT) (SGPT): CPT

## 2019-07-01 PROCEDURE — 84450 TRANSFERASE (AST) (SGOT): CPT

## 2019-07-01 PROCEDURE — 36415 COLL VENOUS BLD VENIPUNCTURE: CPT

## 2019-07-01 PROCEDURE — 80051 ELECTROLYTE PANEL: CPT

## 2019-07-01 PROCEDURE — 80197 ASSAY OF TACROLIMUS: CPT

## 2019-07-01 PROCEDURE — 82565 ASSAY OF CREATININE: CPT

## 2019-07-03 LAB
TACROLIMUS BLOOD: 7.2 NG/ML
TACROLIMUS BLOOD: NORMAL NG/ML

## 2019-07-18 ENCOUNTER — TELEPHONE (OUTPATIENT)
Dept: FAMILY MEDICINE CLINIC | Age: 59
End: 2019-07-18

## 2019-07-18 NOTE — TELEPHONE ENCOUNTER
----- Message from Kiran Goode sent at 7/18/2019  3:51 PM EDT -----  TRADJENTA 5MG   [facility] AT [address] PUT HER ON THIS. THEY  DID A 90 DAY MAIL ORDER AND SHE''LL RUN OUT BEFORE AND THEY TOLD HER TO HAVE FAMILY DR SWIFT IN SHORT SCRIPT TO HOLD HER OVER.           [facility]

## 2019-07-27 DIAGNOSIS — G97.1 SPINAL HEADACHE: ICD-10-CM

## 2019-07-27 DIAGNOSIS — C43.71 MALIGNANT MELANOMA OF RIGHT KNEE (HCC): ICD-10-CM

## 2019-07-27 DIAGNOSIS — Z86.69 HISTORY OF MIGRAINE: ICD-10-CM

## 2019-07-27 DIAGNOSIS — N20.0 KIDNEY STONE: ICD-10-CM

## 2019-07-27 DIAGNOSIS — E79.0 ELEVATED BLOOD URIC ACID LEVEL: ICD-10-CM

## 2019-07-27 DIAGNOSIS — Z78.0 POST-MENOPAUSAL: ICD-10-CM

## 2019-07-27 DIAGNOSIS — C80.1 CANCER (HCC): ICD-10-CM

## 2019-07-27 RX ORDER — LISINOPRIL 20 MG/1
20 TABLET ORAL DAILY
COMMUNITY
End: 2019-09-03

## 2019-07-27 RX ORDER — MAGNESIUM OXIDE 400 MG/1
400 TABLET ORAL DAILY
COMMUNITY
End: 2019-09-03

## 2019-07-29 ENCOUNTER — HOSPITAL ENCOUNTER (OUTPATIENT)
Age: 59
Discharge: HOME | End: 2019-07-29
Payer: COMMERCIAL

## 2019-07-29 LAB
ALBUMIN SERPL-MCNC: 4.6 GM/DL (ref 3.4–5)
ALT SERPL-CCNC: 10 U/L (ref 10–40)
AST SERPL-CCNC: 13 IU/L (ref 15–37)
BASOPHILS ABSOLUTE: 0 K/CU MM
BASOPHILS RELATIVE PERCENT: 0.2 % (ref 0–1)
BILIRUB SERPL-MCNC: 1.5 MG/DL (ref 0–1)
BILIRUBIN DIRECT: 0.3 MG/DL (ref 0–0.3)
BUN BLDV-MCNC: 18 MG/DL (ref 6–23)
CALCIUM SERPL-MCNC: 9.7 MG/DL (ref 8.3–10.6)
CHLORIDE BLD-SCNC: 104 MMOL/L (ref 99–110)
CO2: 23 MMOL/L (ref 21–32)
CREAT SERPL-MCNC: 0.9 MG/DL (ref 0.6–1.1)
DIFFERENTIAL TYPE: ABNORMAL
EOSINOPHILS ABSOLUTE: 0.1 K/CU MM
EOSINOPHILS RELATIVE PERCENT: 1.1 % (ref 0–3)
GFR AFRICAN AMERICAN: >60 ML/MIN/1.73M2
GFR NON-AFRICAN AMERICAN: >60 ML/MIN/1.73M2
GLUCOSE BLD-MCNC: 142 MG/DL (ref 70–99)
HCT VFR BLD CALC: 37.1 % (ref 37–47)
HEMOGLOBIN: 11.3 GM/DL (ref 12.5–16)
IMMATURE NEUTROPHIL %: 0.2 % (ref 0–0.43)
LYMPHOCYTES ABSOLUTE: 1.7 K/CU MM
LYMPHOCYTES RELATIVE PERCENT: 31.6 % (ref 24–44)
MAGNESIUM: 1.9 MG/DL (ref 1.8–2.4)
MCH RBC QN AUTO: 31.7 PG (ref 27–31)
MCHC RBC AUTO-ENTMCNC: 30.5 % (ref 32–36)
MCV RBC AUTO: 103.9 FL (ref 78–100)
MONOCYTES ABSOLUTE: 0.3 K/CU MM
MONOCYTES RELATIVE PERCENT: 6.1 % (ref 0–4)
NUCLEATED RBC %: 0 %
PDW BLD-RTO: 13.5 % (ref 11.7–14.9)
PHOSPHORUS: 3 MG/DL (ref 2.5–4.9)
PLATELET # BLD: 148 K/CU MM (ref 140–440)
PMV BLD AUTO: 10.4 FL (ref 7.5–11.1)
POTASSIUM SERPL-SCNC: 4.1 MMOL/L (ref 3.5–5.1)
RBC # BLD: 3.57 M/CU MM (ref 4.2–5.4)
SEGMENTED NEUTROPHILS ABSOLUTE COUNT: 3.2 K/CU MM
SEGMENTED NEUTROPHILS RELATIVE PERCENT: 60.8 % (ref 36–66)
SODIUM BLD-SCNC: 142 MMOL/L (ref 135–145)
TOTAL IMMATURE NEUTOROPHIL: 0.01 K/CU MM
TOTAL NUCLEATED RBC: 0 K/CU MM
WBC # BLD: 5.2 K/CU MM (ref 4–10.5)

## 2019-07-29 PROCEDURE — 82310 ASSAY OF CALCIUM: CPT

## 2019-07-29 PROCEDURE — 82247 BILIRUBIN TOTAL: CPT

## 2019-07-29 PROCEDURE — 84460 ALANINE AMINO (ALT) (SGPT): CPT

## 2019-07-29 PROCEDURE — 85025 COMPLETE CBC W/AUTO DIFF WBC: CPT

## 2019-07-29 PROCEDURE — 84100 ASSAY OF PHOSPHORUS: CPT

## 2019-07-29 PROCEDURE — 80051 ELECTROLYTE PANEL: CPT

## 2019-07-29 PROCEDURE — 82040 ASSAY OF SERUM ALBUMIN: CPT

## 2019-07-29 PROCEDURE — 84450 TRANSFERASE (AST) (SGOT): CPT

## 2019-07-29 PROCEDURE — 36415 COLL VENOUS BLD VENIPUNCTURE: CPT

## 2019-07-29 PROCEDURE — 80197 ASSAY OF TACROLIMUS: CPT

## 2019-07-29 PROCEDURE — 82565 ASSAY OF CREATININE: CPT

## 2019-07-29 PROCEDURE — 83735 ASSAY OF MAGNESIUM: CPT

## 2019-07-29 PROCEDURE — 82248 BILIRUBIN DIRECT: CPT

## 2019-07-29 PROCEDURE — 84520 ASSAY OF UREA NITROGEN: CPT

## 2019-07-31 LAB
TACROLIMUS BLOOD: 8 NG/ML
TACROLIMUS BLOOD: NORMAL NG/ML

## 2019-09-03 ENCOUNTER — OFFICE VISIT (OUTPATIENT)
Dept: FAMILY MEDICINE CLINIC | Age: 59
End: 2019-09-03
Payer: COMMERCIAL

## 2019-09-03 VITALS
DIASTOLIC BLOOD PRESSURE: 60 MMHG | WEIGHT: 145 LBS | HEART RATE: 60 BPM | SYSTOLIC BLOOD PRESSURE: 104 MMHG | BODY MASS INDEX: 27.38 KG/M2 | HEIGHT: 61 IN

## 2019-09-03 DIAGNOSIS — I15.1 HYPERTENSION SECONDARY TO OTHER RENAL DISORDERS: ICD-10-CM

## 2019-09-03 DIAGNOSIS — N28.89 HYPERTENSION SECONDARY TO OTHER RENAL DISORDERS: ICD-10-CM

## 2019-09-03 DIAGNOSIS — E11.8 TYPE 2 DIABETES MELLITUS WITH COMPLICATION, WITHOUT LONG-TERM CURRENT USE OF INSULIN (HCC): Primary | ICD-10-CM

## 2019-09-03 DIAGNOSIS — F33.41 RECURRENT MAJOR DEPRESSIVE DISORDER, IN PARTIAL REMISSION (HCC): ICD-10-CM

## 2019-09-03 PROCEDURE — 99214 OFFICE O/P EST MOD 30 MIN: CPT | Performed by: FAMILY MEDICINE

## 2019-09-03 RX ORDER — LANOLIN ALCOHOL/MO/W.PET/CERES
325 CREAM (GRAM) TOPICAL
COMMUNITY
End: 2020-03-09

## 2019-09-03 RX ORDER — MONTELUKAST SODIUM 10 MG/1
10 TABLET ORAL DAILY
Qty: 90 TABLET | Refills: 1 | Status: SHIPPED | OUTPATIENT
Start: 2019-09-03 | End: 2020-02-17 | Stop reason: SDUPTHER

## 2019-09-03 RX ORDER — TIZANIDINE HYDROCHLORIDE 2 MG/1
2 CAPSULE, GELATIN COATED ORAL 2 TIMES DAILY PRN
Qty: 40 CAPSULE | Refills: 2 | Status: SHIPPED | OUTPATIENT
Start: 2019-09-03 | End: 2020-02-17 | Stop reason: SDUPTHER

## 2019-09-03 RX ORDER — TACROLIMUS 0.5 MG/1
0.5 CAPSULE ORAL 2 TIMES DAILY
COMMUNITY
End: 2020-09-21

## 2019-09-03 RX ORDER — DAPSONE 100 MG/1
100 TABLET ORAL DAILY
COMMUNITY
End: 2021-12-14

## 2019-09-03 RX ORDER — LANOLIN ALCOHOL/MO/W.PET/CERES
3 CREAM (GRAM) TOPICAL DAILY
COMMUNITY
End: 2020-12-15

## 2020-02-17 RX ORDER — MONTELUKAST SODIUM 10 MG/1
10 TABLET ORAL DAILY
Qty: 90 TABLET | Refills: 1 | Status: SHIPPED | OUTPATIENT
Start: 2020-02-17 | End: 2020-04-23 | Stop reason: SDUPTHER

## 2020-02-17 RX ORDER — TIZANIDINE HYDROCHLORIDE 2 MG/1
2 CAPSULE, GELATIN COATED ORAL 2 TIMES DAILY PRN
Qty: 40 CAPSULE | Refills: 2 | Status: SHIPPED | OUTPATIENT
Start: 2020-02-17 | End: 2020-03-18

## 2020-03-09 ENCOUNTER — OFFICE VISIT (OUTPATIENT)
Dept: FAMILY MEDICINE CLINIC | Age: 60
End: 2020-03-09
Payer: COMMERCIAL

## 2020-03-09 VITALS
DIASTOLIC BLOOD PRESSURE: 62 MMHG | SYSTOLIC BLOOD PRESSURE: 110 MMHG | WEIGHT: 145.4 LBS | HEART RATE: 64 BPM | BODY MASS INDEX: 27.45 KG/M2 | HEIGHT: 61 IN

## 2020-03-09 PROCEDURE — 99214 OFFICE O/P EST MOD 30 MIN: CPT | Performed by: FAMILY MEDICINE

## 2020-03-10 NOTE — PROGRESS NOTES
to do every MWF\" follow with Dr Lori Cortez Elevated blood uric acid level     Fibrocystic breast     History of migraine     \"stopped with periods stopped\"    Kidney stone     Malignant melanoma of right knee ([address]     Osteopenia     Polycystic kidney disease     Post-menopausal     Prolonged emergence from general anesthesia     \"took me awhile to come out of anesthesia with fistula surgery\"    Reactive airway disease     Spinal headache     \"with my first \"       FAMILY HISTORY  Family History   Problem Relation Age of Onset    Cancer Mother         ovarian cancer    Diabetes Mother     High Blood Pressure Father     Kidney Disease Father         PKD- transplant    Kidney Disease Sister     Diabetes Maternal Aunt     Diabetes Maternal Uncle     Kidney Disease Paternal Aunt     Kidney Disease Paternal Uncle     Diabetes Maternal Grandmother     Kidney Disease Paternal Grandfather     Asthma Son        SOCIAL HISTORY  Social History     Socioeconomic History    Marital status: Legally      Spouse name: Not on file    Number of children: Not on file    Years of education: Not on file    Highest education level: Not on file   Occupational History    Not on file   Social Needs    Financial resource strain: Not on file    Food insecurity     Worry: Not on file     Inability: Not on file    Transportation needs     Medical: Not on file     Non-medical: Not on file   Tobacco Use    Smoking status: Never Smoker    Smokeless tobacco: Never Used   Substance and Sexual Activity    Alcohol use: No    Drug use: No    Sexual activity: Not on file   Lifestyle    Physical activity     Days per week: Not on file     Minutes per session: Not on file    Stress: Not on file   Relationships    Social connections     Talks on phone: Not on file     Gets together: Not on file     Attends Gnosticist service: Not on file     Active member of club or organization: Not on file     Attends meetings of clubs or organizations: Not on file     Relationship status: Not on file    Intimate partner violence     Fear of current or ex partner: Not on file     Emotionally abused: Not on file     Physically abused: Not on file     Forced sexual activity: Not on file   Other Topics Concern    Not on file   Social History Narrative    Not on file        SURGICAL HISTORY  Past Surgical History:   Procedure Laterality Date    BREAST LUMPECTOMY Right     \"had several breast bxs in past     SECTION  1867/3198( with BPS)    X2    DIALYSIS FISTULA CREATION  2016    left   [address]    \"behind right ear\" Left side of neck    OTHER SURGICAL HISTORY Bilateral 2017    multiple hepatic cyst drainage in CT w/ Dr Jluis Vasquez  2016    removal of melanoma    TUBAL LIGATION         CURRENT MEDICATIONS  Current Outpatient Medications   Medication Sig Dispense Refill    Calcium-Cholecalciferol 500-200 MG-UNIT TABS Take 1 tablet by mouth daily 90 tablet 1    sertraline (ZOLOFT) 50 MG tablet Take 1 tablet by mouth daily 90 tablet 1    tiZANidine (ZANAFLEX) 2 MG capsule Take 1 capsule by mouth 2 times daily as needed for Muscle spasms 40 capsule 2    montelukast (SINGULAIR) 10 MG tablet Take 1 tablet by mouth daily 90 tablet 1    melatonin 3 MG TABS tablet Take 3 mg by mouth daily      linagliptin (TRADJENTA) 5 MG tablet Take 5 mg by mouth daily      tacrolimus (PROGRAF) 0.5 MG capsule Take 0.5 mg by mouth 2 times daily      dapsone 100 MG tablet Take 100 mg by mouth daily      mycophenolate (MYFORTIC) 180 MG DR tablet Take 360 mg by mouth 2 times daily      aspirin 81 MG tablet Take 81 mg by mouth daily      docusate sodium (COLACE) 100 MG capsule Take 100 mg by mouth daily      tacrolimus (PROGRAF) 1 MG capsule Take 1 mg by mouth 2 times daily 3 capsules by mouth BID      acetaminophen (TYLENOL) 500 MG tablet Take 500 mg by mouth as needed

## 2020-03-17 ENCOUNTER — TELEPHONE (OUTPATIENT)
Dept: FAMILY MEDICINE CLINIC | Age: 60
End: 2020-03-17

## 2020-03-23 ENCOUNTER — TELEPHONE (OUTPATIENT)
Dept: FAMILY MEDICINE CLINIC | Age: 60
End: 2020-03-23

## 2020-03-23 NOTE — TELEPHONE ENCOUNTER
[facility] called about a medication they need to send here for the pt-- they need how many and dosages of these shots-    Please call [facility] back [phone]

## 2020-03-24 ENCOUNTER — TELEPHONE (OUTPATIENT)
Dept: FAMILY MEDICINE CLINIC | Age: 60
End: 2020-03-24

## 2020-04-22 NOTE — TELEPHONE ENCOUNTER
PTS SINGULAR WAS SHIPPED FROM MAIL ORDER TO SOON TO BE EX. PT DOUBLE CHECKED WITH MAIL ORDER ON ADDRESS AND IT'S FIXED.  CAN YOU CX THAT SCRIPT AND RESEND IT AND ALSO A SHORT SCRIPT TO [facility]    LV-3/9  NA-9/11    2 DIFFERENT PHARM-    [facility]    ENVISION

## 2020-04-23 RX ORDER — MONTELUKAST SODIUM 10 MG/1
10 TABLET ORAL DAILY
Qty: 90 TABLET | Refills: 1 | Status: SHIPPED | OUTPATIENT
Start: 2020-04-23 | End: 2020-08-04 | Stop reason: SDUPTHER

## 2020-04-24 ENCOUNTER — TELEPHONE (OUTPATIENT)
Dept: FAMILY MEDICINE CLINIC | Age: 60
End: 2020-04-24

## 2020-05-20 ENCOUNTER — TELEPHONE (OUTPATIENT)
Dept: FAMILY MEDICINE CLINIC | Age: 60
End: 2020-05-20

## 2020-05-20 NOTE — TELEPHONE ENCOUNTER
PT IS CALLING TO SEE WHAT TO DO ABOUT GETTING HER [facility] INJECTION. SC[facility]AC[facility] IS NOT DOING THOSE RIGHT NOW AND SHE WAS DUE IN April.

## 2020-06-17 ENCOUNTER — PATIENT MESSAGE (OUTPATIENT)
Dept: FAMILY MEDICINE CLINIC | Age: 60
End: 2020-06-17

## 2020-07-06 ENCOUNTER — PATIENT MESSAGE (OUTPATIENT)
Dept: FAMILY MEDICINE CLINIC | Age: 60
End: 2020-07-06

## 2020-07-07 ENCOUNTER — TELEPHONE (OUTPATIENT)
Dept: FAMILY MEDICINE CLINIC | Age: 60
End: 2020-07-07

## 2020-07-08 ENCOUNTER — OFFICE VISIT (OUTPATIENT)
Dept: FAMILY MEDICINE CLINIC | Age: 60
End: 2020-07-08
Payer: MEDICAID

## 2020-07-08 VITALS
HEIGHT: 62 IN | OXYGEN SATURATION: 93 % | TEMPERATURE: 97.3 F | HEART RATE: 66 BPM | DIASTOLIC BLOOD PRESSURE: 60 MMHG | WEIGHT: 150.3 LBS | BODY MASS INDEX: 27.66 KG/M2 | SYSTOLIC BLOOD PRESSURE: 115 MMHG

## 2020-07-08 LAB
BILIRUBIN, POC: NORMAL
BLOOD URINE, POC: NORMAL
CLARITY, POC: CLEAR
COLOR, POC: YELLOW
GLUCOSE URINE, POC: NORMAL
KETONES, POC: NORMAL
LEUKOCYTE EST, POC: NORMAL
NITRITE, POC: NORMAL
PH, POC: 5.5
PROTEIN, POC: NORMAL
SPECIFIC GRAVITY, POC: 1.02
UROBILINOGEN, POC: 0.2

## 2020-07-08 PROCEDURE — 99213 OFFICE O/P EST LOW 20 MIN: CPT | Performed by: FAMILY MEDICINE

## 2020-07-08 PROCEDURE — G8419 CALC BMI OUT NRM PARAM NOF/U: HCPCS | Performed by: FAMILY MEDICINE

## 2020-07-08 PROCEDURE — G8427 DOCREV CUR MEDS BY ELIG CLIN: HCPCS | Performed by: FAMILY MEDICINE

## 2020-07-08 PROCEDURE — 3017F COLORECTAL CA SCREEN DOC REV: CPT | Performed by: FAMILY MEDICINE

## 2020-07-08 PROCEDURE — 81002 URINALYSIS NONAUTO W/O SCOPE: CPT | Performed by: FAMILY MEDICINE

## 2020-07-08 PROCEDURE — 1036F TOBACCO NON-USER: CPT | Performed by: FAMILY MEDICINE

## 2020-07-08 RX ORDER — DENOSUMAB 60 MG/ML
INJECTION SUBCUTANEOUS
Refills: 0 | OUTPATIENT
Start: 2020-07-08

## 2020-07-08 RX ORDER — MICONAZOLE NITRATE 1200MG-2%
1 KIT VAGINAL DAILY
Qty: 1 KIT | Refills: 0 | Status: SHIPPED | OUTPATIENT
Start: 2020-07-08 | End: 2020-09-21

## 2020-07-08 RX ORDER — NITROFURANTOIN 25; 75 MG/1; MG/1
100 CAPSULE ORAL 2 TIMES DAILY
Qty: 10 CAPSULE | Refills: 0 | Status: SHIPPED | OUTPATIENT
Start: 2020-07-08 | End: 2020-07-13

## 2020-07-08 RX ORDER — FLUCONAZOLE 150 MG/1
150 TABLET ORAL
Qty: 2 TABLET | Refills: 0 | Status: SHIPPED | OUTPATIENT
Start: 2020-07-08 | End: 2020-07-14

## 2020-07-08 ASSESSMENT — ENCOUNTER SYMPTOMS
SHORTNESS OF BREATH: 0
NAUSEA: 0
BACK PAIN: 1
COUGH: 1
ABDOMINAL PAIN: 0
VOMITING: 0
DIARRHEA: 1

## 2020-07-08 NOTE — PROGRESS NOTES
Musculoskeletal: Positive for back pain (low back pain). Neurological: Negative for dizziness, light-headedness and headaches.        PAST MEDICAL HISTORY  Past Medical History:   Diagnosis Date    Allergic rhinitis     Cancer ([facility] [address]     melanoma right knee- removed 2016    Chronic kidney disease     \"to start dialysis on 2017- to do every MWF\" follow with Dr Liza Perez Elevated blood uric acid level     Fibrocystic breast     History of migraine     \"stopped with periods stopped\"    Kidney stone     Malignant melanoma of right knee ([facility] [address]     Osteopenia     Polycystic kidney disease     Post-menopausal     Prolonged emergence from general anesthesia     \"took me awhile to come out of anesthesia with fistula surgery\"    Reactive airway disease     Spinal headache     \"with my first \"       FAMILY HISTORY  Family History   Problem Relation Age of Onset    Cancer Mother         ovarian cancer    Diabetes Mother     High Blood Pressure Father     Kidney Disease Father         PKD- transplant    Kidney Disease Sister     Diabetes Maternal Aunt     Diabetes Maternal Uncle     Kidney Disease Paternal Aunt     Kidney Disease Paternal Uncle     Diabetes Maternal Grandmother     Kidney Disease Paternal Grandfather     Asthma Son        SOCIAL HISTORY  Social History     Socioeconomic History    Marital status: Legally      Spouse name: None    Number of children: None    Years of education: None    Highest education level: None   Occupational History    None   Social Needs    Financial resource strain: None    Food insecurity     Worry: None     Inability: None    Transportation needs     Medical: None     Non-medical: None   Tobacco Use    Smoking status: Never Smoker    Smokeless tobacco: Never Used   Substance and Sexual Activity    Alcohol use: No    Drug use: No    Sexual activity: None   Lifestyle    Physical activity     Days per week: None     Minutes per session: None    Stress: None   Relationships    Social connections     Talks on phone: None     Gets together: None     Attends Buddhism service: None     Active member of club or organization: None     Attends meetings of clubs or organizations: None     Relationship status: None    Intimate partner violence     Fear of current or ex partner: None     Emotionally abused: None     Physically abused: None     Forced sexual activity: None   Other Topics Concern    None   Social History Narrative    None        SURGICAL HISTORY  Past Surgical History:   Procedure Laterality Date    BREAST LUMPECTOMY Right     \"had several breast bxs in past     SECTION  9953/4123( with BPS)    X2    DIALYSIS FISTULA CREATION  2016    left    KIDNEY TRANSPLANT      LYMPH NODE BIOPSY      \"behind right ear\" Left side of neck    OTHER SURGICAL HISTORY Bilateral 2017    multiple hepatic cyst drainage in CT w/ Dr Michelle Abarca  2016    removal of melanoma    TUBAL LIGATION  1985       CURRENT MEDICATIONS  Current Outpatient Medications   Medication Sig Dispense Refill    nitrofurantoin, macrocrystal-monohydrate, (MACROBID) 100 MG capsule Take 1 capsule by mouth 2 times daily for 5 days 10 capsule 0    fluconazole (DIFLUCAN) 150 MG tablet Take 1 tablet by mouth every 72 hours for 6 days 2 tablet 0    Miconazole Nitrate-Wipes (MONISTAT 3 COMBINATION PACK) 200-2 MG-% KIT Place 1 Units vaginally daily 1 kit 0    denosumab (PROLIA) 60 MG/ML SOSY SC injection Inject 60 mg into the skin once      SITagliptin Phosphate (JANUVIA PO) Take by mouth daily      tiZANidine (ZANAFLEX) 2 MG tablet Take 2 mg by mouth as needed      montelukast (SINGULAIR) 10 MG tablet Take 1 tablet by mouth daily 90 tablet 1    Calcium-Cholecalciferol 500-200 MG-UNIT TABS Take 1 tablet by mouth daily 90 tablet 1    sertraline (ZOLOFT) 50 MG tablet Take 1 tablet by mouth daily 90 tablet 1    melatonin 3 MG TABS tablet Take 3 mg by mouth daily      tacrolimus (PROGRAF) 0.5 MG capsule Take 0.5 mg by mouth 2 times daily      dapsone 100 MG tablet Take 100 mg by mouth daily      mycophenolate (MYFORTIC) 180 MG DR tablet Take 180 mg by mouth 2 times daily       aspirin 81 MG tablet Take 81 mg by mouth daily      docusate sodium (COLACE) 100 MG capsule Take 100 mg by mouth daily      tacrolimus (PROGRAF) 1 MG capsule Take 1 mg by mouth 2 times daily 3 capsules by mouth BID      acetaminophen (TYLENOL) 500 MG tablet Take 500 mg by mouth as needed for Pain      BIOTIN 5000 PO Take 2 tablets by mouth daily       cetirizine (ZYRTEC) 5 MG tablet Take 10 mg by mouth daily. No current facility-administered medications for this visit. ALLERGIES  Allergies   Allergen Reactions    Pcn [Penicillins] Anaphylaxis     \"have trouble breathing, hives and itching\"    Nsaids     Tape [Adhesive Tape]      Can use the paper tape    Ciprofloxacin Rash    Sulfa Antibiotics Nausea And Vomiting    Tramadol Nausea And Vomiting       PHYSICAL EXAM    Physical Exam  Vitals signs and nursing note reviewed. Constitutional:       General: She is not in acute distress. Appearance: She is well-developed. She is not diaphoretic. HENT:      Head: Normocephalic and atraumatic. Cardiovascular:      Rate and Rhythm: Normal rate and regular rhythm. Heart sounds: Normal heart sounds. Pulmonary:      Effort: Pulmonary effort is normal. No respiratory distress. Breath sounds: Normal breath sounds. Abdominal:      General: Bowel sounds are normal.      Palpations: Abdomen is soft. Tenderness: There is no abdominal tenderness. There is no right CVA tenderness, left CVA tenderness, guarding or rebound. Skin:     General: Skin is warm and dry. Neurological:      Mental Status: She is alert and oriented to person, place, and time. Psychiatric:         Thought Content:  Thought content normal.         ASSESSMENT & PLAN    1. Dysuria  Patient's urinalysis here in the office did not reveal any signs of a urinary tract infection. Patient was prescribed Macrobid, but is to hold off on starting this medication after discussion with Dr. Nohemy Lugo based on the possible side effects and interactions with her transplant medications. Will start Macrobid if necessary based on her urine culture but otherwise we can wait at this time. - POCT Urinalysis no Micro  - Culture, Urine  - nitrofurantoin, macrocrystal-monohydrate, (MACROBID) 100 MG capsule; Take 1 capsule by mouth 2 times daily for 5 days  Dispense: 10 capsule; Refill: 0    2. Yeast infection  We will have the patient try Monistat 3 combo pack initially for treatment of her yeast infection while we wait for the results of her urine culture to come back. Patient was prescribed fluconazole but again due to possible interactions with her transplant medications, we would like to try the topical form of antifungal first.  Patient was instructed not to start Diflucan or the Macrobid until we see her urine culture, and see how the Monistat does. - fluconazole (DIFLUCAN) 150 MG tablet; Take 1 tablet by mouth every 72 hours for 6 days  Dispense: 2 tablet; Refill: 0    Patient is to follow-up as scheduled in November, unless otherwise needed in that time. Pt is to call with any questions, concerns, or increase in symptoms. Pt verbalized understanding of and agreement with current plan of care. Electronically signed by Sherlyn Siemens, PA on 7/8/2020      Please note that this chart was generated using dragon dictation software. Although every effort was made to ensure the accuracy of this automated transcription, some errors in transcription may have occurred.

## 2020-07-08 NOTE — PATIENT INSTRUCTIONS
infection has cleared. Follow your doctor's instructions. Use this medicine for the full prescribed length of time, even if your symptoms quickly improve. Skipping doses can increase your risk of infection that is resistant to medication. Nitrofurantoin will not treat a viral infection such as the flu or a common cold. This medicine can affect the results of certain medical tests. Tell any doctor who treats you that you are using nitrofurantoin. If you use this medicine long-term, you may need frequent medical tests. Store at room temperature away from moisture, heat, and light. Do not freeze the liquid medicine, and keep the bottle tightly closed when not in use. Throw away any nitrofurantoin liquid that has not been used within 30 days. What happens if I miss a dose? Take the medicine as soon as you can, but skip the missed dose if it is almost time for your next dose. Do not take two doses at one time. What happens if I overdose? Seek emergency medical attention or call the Poison Help line at 1-124.596.1390. Overdose can cause vomiting. What should I avoid while taking nitrofurantoin? Antibiotic medicines can cause diarrhea, which may be a sign of a new infection. If you have diarrhea that is watery or bloody, call your doctor before using anti-diarrhea medicine. Avoid taking an antacid that contains magnesium trisilicate, which could make it harder for your body to absorb nitrofurantoin. What are the possible side effects of nitrofurantoin? Get emergency medical help if you have signs of an allergic reaction (hives, difficult breathing, swelling in your face or throat) or a severe skin reaction (fever, sore throat, burning eyes, skin pain, red or purple skin rash with blistering and peeling).   Call your doctor at once if you have:  · severe stomach pain, diarrhea that is watery or bloody (even if it occurs months after your last dose);  · vision problems;  · fever, chills, cough, chest pain, trouble breathing;  · numbness, tingling, or burning pain in your hands or feet;  · severe pain behind your eyes;  · pale skin, weakness;  · joint pain or swelling with fever, swollen glands, and muscle aches;  · pain, redness, or swelling in your lower jaw;  · increased pressure inside the skull --severe headaches, ringing in your ears, dizziness, nausea, vision problems, pain behind your eyes; or  · signs of liver or pancreas problems --upper stomach pain (that may spread to your back), nausea or vomiting, dark urine, yellowing of the skin or eyes. Side effects may be more likely in older adults. Common side effects may include:  · headache, dizziness, drowsiness, weakness;  · gas, indigestion, loss of appetite;  · nausea, vomiting;  · muscle or joint pain;  · rash, itching; or  · temporary hair loss. This is not a complete list of side effects and others may occur. Call your doctor for medical advice about side effects. You may report side effects to FDA at 5-043-FDA-7255. What other drugs will affect nitrofurantoin? Other drugs may affect nitrofurantoin, including prescription and over-the-counter medicines, vitamins, and herbal products. Tell your doctor about all your current medicines and any medicine you start or stop using. Where can I get more information? Your pharmacist can provide more information about nitrofurantoin. Remember, keep this and all other medicines out of the reach of children, never share your medicines with others, and use this medication only for the indication prescribed. Every effort has been made to ensure that the information provided by [address] is accurate, up-to-date, and complete, but no guarantee is made to that effect. Drug information contained herein may be time sensitive.  [employer] information has been compiled for use by healthcare practitioners and consumers in the [place] and therefore [employer] does not warrant that uses outside of the bladder, genital area, and the blood. Fluconazole is also used to prevent fungal infection in people who have a weak immune system caused by cancer treatment, bone marrow transplant, or diseases such as AIDS. Fluconazole is also used to treat a certain type of meningitis in people with HIV or AIDS. Fluconazole may also be used for purposes not listed in this medication guide. What should I discuss with my healthcare provider before taking fluconazole? You should not use fluconazole if you are allergic to it. Some medicines can cause unwanted or dangerous effects when used with fluconazole. Your doctor may change your treatment plan if you also use:  · an antibiotic, antifungal, or antiviral medicine;  · a blood thinner;  · cancer medicine;  · cholesterol medication;  · oral diabetes medicine;  · heart or blood pressure medication;  · medicine for malaria or tuberculosis;  · medicine to prevent organ transplant rejection;  · medicine to treat depression or mental illness;  · an NSAID (nonsteroidal anti-inflammatory drug);  · seizure medicine; or  · steroid medicine. Tell your doctor if you have ever had:  · liver disease;  · HIV or AIDS;  · cancer;  · heart problems;  · long QT syndrome (in you or a family member);  · kidney disease; or  · if you are allergic to other antifungal medicine (such as ketoconazole, itraconazole, miconazole, posaconazole, voriconazole, and others). The liquid form of fluconazole contains sucrose. Talk to your doctor before using this form of fluconazole if you have a problem digesting sugars or milk. It is not known whether this medicine will harm an unborn baby. Tell your doctor if you are pregnant or plan to become pregnant. It may not be safe to breastfeed while using this medicine. Ask your doctor about any risk. How should I take fluconazole? Follow all directions on your prescription label and read all medication guides or instruction sheets.  Use the medicine exactly as directed. Your dose will depend on the infection you are treating. Vaginal infections are often treated with only one pill. For other infections, your first dose may be a double dose. Carefully follow your doctor's instructions. Fluconazole oral  is taken by mouth. Fluconazole injection is given as an infusion into a vein. You may take fluconazole with or without food. Shake the oral suspension (liquid) before you measure a dose. Use the dosing syringe provided, or use a medicine dose-measuring device (not a kitchen spoon). Fluconazole injection is given as an infusion into a vein. A healthcare provider will give your first dose and may teach you how to properly use the medication by yourself. Prepare an injection only when you are ready to give it. Do not use if the medicine looks cloudy, has changed colors, or has particles in it. Call your pharmacist for new medicine. Use this medicine for the full prescribed length of time, even if your symptoms quickly improve. Skipping doses can increase your risk of infection that is resistant to medication. Fluconazole will not treat a viral infection such as the flu or a common cold. Call your doctor if your symptoms do not improve, or if they get worse. Store fluconazole at room temperature away from moisture and heat. Do not freeze. You may store the oral suspension in a refrigerator, but do not allow it to freeze. Throw away any leftover liquid that is more than 3weeks old. What happens if I miss a dose? Use the medicine as soon as you can, but skip the missed dose if it is almost time for your next dose. Do not use two doses at one time. What happens if I overdose? Seek emergency medical attention or call the Poison Help line at 1-873.578.2347. Overdose symptoms may include confusion or unusual thoughts or behavior. What should I avoid while using fluconazole?   Follow your doctor's instructions about any restrictions on food, beverages, or vitamins, and herbal products. Not all possible interactions are listed here. Where can I get more information? Your pharmacist can provide more information about fluconazole. Remember, keep this and all other medicines out of the reach of children, never share your medicines with others, and use this medication only for the indication prescribed. Every effort has been made to ensure that the information provided by [facility] is accurate, up-to-date, and complete, but no guarantee is made to that effect. Drug information contained herein may be time sensitive. [facility] information has been compiled for use by healthcare practitioners and consumers in the [place] and therefore [facility] does not warrant that uses outside of the [place] are appropriate, unless specifically indicated otherwise. [facility]'s drug information does not endorse drugs, diagnose patients or recommend therapy. [facility]'s drug information is an informational resource designed to assist licensed healthcare practitioners in caring for their patients and/or to serve consumers viewing this service as a supplement to, and not a substitute for, the expertise, skill, knowledge and judgment of healthcare practitioners. The absence of a warning for a given drug or drug combination in no way should be construed to indicate that the drug or drug combination is safe, effective or appropriate for any given patient. [facility] does not assume any responsibility for any aspect of healthcare administered with the aid of information [facility] provides. The information contained herein is not intended to cover all possible uses, directions, precautions, warnings, drug interactions, allergic reactions, or adverse effects. If you have questions about the drugs you are taking, check with your doctor, nurse or pharmacist.  Copyright 3253-1683 [address]. Version: 11.01. Revision date: 10/2/2019.   Care instructions adapted under license by [facility] ([facility]). If you have questions about a medical condition or this instruction, always ask your healthcare professional. [place] [address] any warranty or liability for your use of this information.

## 2020-07-09 LAB — URINE CULTURE, ROUTINE: NORMAL

## 2020-07-13 NOTE — RESULT ENCOUNTER NOTE
Spoke with the patient who states that she confirmed with her transplant team that she is able to take these medications with some monitoring as to not interfere with her transplant medications. She did take the [facility] and was going to take the fluconazole but she is feeling better. She was unable to get the Monistat topical treatment but is doing much better. Patient is to call with any other questions or concerns.

## 2020-08-04 RX ORDER — MONTELUKAST SODIUM 10 MG/1
10 TABLET ORAL DAILY
Qty: 30 TABLET | Refills: 0 | Status: SHIPPED | OUTPATIENT
Start: 2020-08-04 | End: 2020-08-31

## 2020-08-04 NOTE — TELEPHONE ENCOUNTER
From: Dotti Cowden  Sent: 8/3/2020 8:21 AM EDT  To: Megan Mitchell LPN  Subject: RE: Visit Follow-Up Question    Please advise     ----- Message -----  From: Dotti Cowden  Sent: 8/2/2020 1:41 AM EDT  To: [facility] Staff  Subject: RE: Visit Follow-Up Question     Message #2 -- Refills requested:  Oyster Nicho Joe 500-Vit D3 200 TAB  Januvia 100 MG TAB  Montelukast SOD 10 MG TAB  Sertraline HCL 50 MG TAB  Thank you.  Ruth Fabian

## 2020-08-04 NOTE — TELEPHONE ENCOUNTER
Requested Prescriptions     Signed Prescriptions Disp Refills    Calcium-Cholecalciferol 500-200 MG-UNIT TABS 30 tablet 0     Sig: Take 1 tablet by mouth daily     Authorizing Provider: Dolores Pierson User: PINKY Kay    SITagliptin (JANUVIA) 100 MG tablet 30 tablet 0     Sig: Take 1 tablet by mouth daily     Authorizing Provider: Dolores Pierson User: PINKY Kay    montelukast (SINGULAIR) 10 MG tablet 30 tablet 0     Sig: Take 1 tablet by mouth daily     Authorizing Provider: Dolores Pierson User: PINKY Kay    sertraline (ZOLOFT) 50 MG tablet 30 tablet 0     Sig: Take 1 tablet by mouth daily     Authorizing Provider: Dolores Pierson User: Damon Kiser

## 2020-08-17 ENCOUNTER — TELEPHONE (OUTPATIENT)
Dept: FAMILY MEDICINE CLINIC | Age: 60
End: 2020-08-17

## 2020-08-26 ENCOUNTER — TELEPHONE (OUTPATIENT)
Dept: FAMILY MEDICINE CLINIC | Age: 60
End: 2020-08-26

## 2020-08-31 RX ORDER — B-COMPLEX WITH VITAMIN C
TABLET ORAL
Qty: 30 TABLET | Refills: 0 | Status: SHIPPED | OUTPATIENT
Start: 2020-08-31 | End: 2020-09-25

## 2020-08-31 RX ORDER — MONTELUKAST SODIUM 10 MG/1
TABLET ORAL
Qty: 30 TABLET | Refills: 0 | Status: SHIPPED | OUTPATIENT
Start: 2020-08-31 | End: 2020-09-21 | Stop reason: SDUPTHER

## 2020-09-21 ENCOUNTER — VIRTUAL VISIT (OUTPATIENT)
Dept: FAMILY MEDICINE CLINIC | Age: 60
End: 2020-09-21
Payer: MEDICAID

## 2020-09-21 PROCEDURE — 99442 PR PHYS/QHP TELEPHONE EVALUATION 11-20 MIN: CPT | Performed by: FAMILY MEDICINE

## 2020-09-21 RX ORDER — MONTELUKAST SODIUM 10 MG/1
TABLET ORAL
Qty: 90 TABLET | Refills: 1 | Status: SHIPPED | OUTPATIENT
Start: 2020-09-21 | End: 2021-02-08 | Stop reason: SDUPTHER

## 2020-09-21 RX ORDER — TIZANIDINE 2 MG/1
2 TABLET ORAL PRN
Qty: 180 TABLET | Refills: 1 | Status: SHIPPED | OUTPATIENT
Start: 2020-09-21 | End: 2020-12-20

## 2020-09-21 RX ORDER — TACROLIMUS 1 MG/1
2.5 CAPSULE ORAL 2 TIMES DAILY
COMMUNITY

## 2020-09-21 NOTE — PROGRESS NOTES
Brook Medina is a 56 y.o. female evaluated via telephone on 9/21/2020. Consent:  She and/or health care decision maker is aware that that she may receive a bill for this telephone service, depending on her insurance coverage, and has provided verbal consent to proceed: Yes      Documentation:  I communicated with the patient and/or health care decision maker about renal and liver transplant, diabetes type 2 and allergic rhinitis. Details of this discussion including any medical advice provided:     Patient notes that her sugars range from 1 11-1 32. She is down 2 pounds in weight. She has no other A1c's. We reviewed all of her transplant labs that I could find. Patient is willing to do our labs but feels that she will take them to [place] [address]. I noted to her that it will be more difficult to follow. Patient get hemoglobin A1c urine microalbumin and lipid profile. Patient feels her emotions are doing well on current medicines and wishes remain the same. She denies side effect of medication. Patient notes allergy drainage. She denies fever color or blood from the drainage. Patient is currently taking an antihistamine of Zyrtec. She is willing to add on Singulair and saline. If these do not resolve the symptoms or if they worsen patient to call in for possible infection. Patient status post a renal and liver transplant. She is to hold that her creatinine is normal.  Patient is doing well and remains on immunosuppressive agents. We discussed COVID cautions. Follow-up 4 months    I affirm this is a Patient Initiated Episode with a Patient who has not had a related appointment within my department in the past 7 days or scheduled within the next 24 hours.     Patient identification was verified at the start of the visit: Yes    Total Time: minutes: 11-20 minutes    Note: not billable if this call serves to triage the patient into an appointment for the relevant concern      Maddie Fregoso Paola Sosa

## 2020-09-25 RX ORDER — B-COMPLEX WITH VITAMIN C
TABLET ORAL
Qty: 90 TABLET | Refills: 1 | Status: SHIPPED | OUTPATIENT
Start: 2020-09-25 | End: 2021-03-17

## 2020-09-25 NOTE — TELEPHONE ENCOUNTER
Requested Prescriptions     Signed Prescriptions Disp Refills    Calcium Carbonate-Vitamin D (OYSTER SHELL CALCIUM/D) 500-200 MG-UNIT TABS 90 tablet 1     Sig: TAKE 1 TABLET BY MOUTH EVERY DAY     Authorizing Provider: Aleyda Gore     Ordering User: Cindy Kamara

## 2020-09-28 LAB
AVERAGE GLUCOSE: 0
CHOLESTEROL, TOTAL: 168 MG/DL
CHOLESTEROL/HDL RATIO: 0
CREATININE, URINE: 154.2
HBA1C MFR BLD: 4.2 %
HDLC SERPL-MCNC: 43 MG/DL (ref 35–70)
LDL CHOLESTEROL CALCULATED: 93 MG/DL (ref 0–160)
MICROALBUMIN/CREAT 24H UR: 28.5 MG/G{CREAT}
MICROALBUMIN/CREAT UR-RTO: 18
NONHDLC SERPL-MCNC: 0 MG/DL
TRIGL SERPL-MCNC: 189 MG/DL
VLDLC SERPL CALC-MCNC: 32 MG/DL

## 2020-11-20 ENCOUNTER — TELEPHONE (OUTPATIENT)
Dept: FAMILY MEDICINE CLINIC | Age: 60
End: 2020-11-20

## 2020-12-07 ENCOUNTER — TELEPHONE (OUTPATIENT)
Dept: FAMILY MEDICINE CLINIC | Age: 60
End: 2020-12-07

## 2020-12-07 NOTE — TELEPHONE ENCOUNTER
Wanted to send the Prolia to our office (schedule a delivery). Prolia does have a Prior Auth, so the ship date will be around 12/16 and delivered on th 12/17/20. Prolia cannot be returned. Prolia 60 mg   ---prefilled syringe injection by doctor once every 6 months.

## 2020-12-15 ENCOUNTER — TELEPHONE (OUTPATIENT)
Dept: FAMILY MEDICINE CLINIC | Age: 60
End: 2020-12-15

## 2020-12-15 NOTE — TELEPHONE ENCOUNTER
Calling regarding a prior auth for Prolia ----    Patient is trying to get shipped tomorrow---Pharmacy faxed over the Prior Auth forms already, but if you would want to call the [employer], that might be faster:    [place]:   [phone]    Mention you are calling about Prolia.

## 2020-12-15 NOTE — TELEPHONE ENCOUNTER
Spoke with Kaitlin lr.  Received prior auth approval dean, spoke with [facility] informed pa approval. Informed pt pa approval

## 2020-12-17 ENCOUNTER — TELEPHONE (OUTPATIENT)
Dept: FAMILY MEDICINE CLINIC | Age: 60
End: 2020-12-17

## 2020-12-17 NOTE — TELEPHONE ENCOUNTER
Spoke with pt informed received shipment prolia.  Pt to  and take to [address] [place] [address] to be administered

## 2020-12-28 ENCOUNTER — TELEPHONE (OUTPATIENT)
Dept: FAMILY MEDICINE CLINIC | Age: 60
End: 2020-12-28

## 2020-12-28 NOTE — TELEPHONE ENCOUNTER
Pt wanted to confirm it was ok to  prolia to take to [address] [place] [address] to be administered. Informed yes.

## 2021-02-08 ENCOUNTER — OFFICE VISIT (OUTPATIENT)
Dept: FAMILY MEDICINE CLINIC | Age: 61
End: 2021-02-08
Payer: MEDICAID

## 2021-02-08 VITALS
HEART RATE: 76 BPM | WEIGHT: 152.6 LBS | BODY MASS INDEX: 28.81 KG/M2 | SYSTOLIC BLOOD PRESSURE: 122 MMHG | HEIGHT: 61 IN | TEMPERATURE: 97.9 F | DIASTOLIC BLOOD PRESSURE: 80 MMHG

## 2021-02-08 DIAGNOSIS — J30.2 SEASONAL ALLERGIC RHINITIS, UNSPECIFIED TRIGGER: ICD-10-CM

## 2021-02-08 DIAGNOSIS — Z94.4 LIVER TRANSPLANT STATUS (HCC): Primary | ICD-10-CM

## 2021-02-08 DIAGNOSIS — F33.41 RECURRENT MAJOR DEPRESSIVE DISORDER, IN PARTIAL REMISSION (HCC): ICD-10-CM

## 2021-02-08 DIAGNOSIS — E11.9 TYPE 2 DIABETES MELLITUS WITHOUT COMPLICATION, WITHOUT LONG-TERM CURRENT USE OF INSULIN (HCC): ICD-10-CM

## 2021-02-08 DIAGNOSIS — Z94.0 KIDNEY TRANSPLANT STATUS: ICD-10-CM

## 2021-02-08 PROCEDURE — G8427 DOCREV CUR MEDS BY ELIG CLIN: HCPCS | Performed by: FAMILY MEDICINE

## 2021-02-08 PROCEDURE — 3046F HEMOGLOBIN A1C LEVEL >9.0%: CPT | Performed by: FAMILY MEDICINE

## 2021-02-08 PROCEDURE — 99214 OFFICE O/P EST MOD 30 MIN: CPT | Performed by: FAMILY MEDICINE

## 2021-02-08 PROCEDURE — G8484 FLU IMMUNIZE NO ADMIN: HCPCS | Performed by: FAMILY MEDICINE

## 2021-02-08 PROCEDURE — 1036F TOBACCO NON-USER: CPT | Performed by: FAMILY MEDICINE

## 2021-02-08 PROCEDURE — G8419 CALC BMI OUT NRM PARAM NOF/U: HCPCS | Performed by: FAMILY MEDICINE

## 2021-02-08 PROCEDURE — 3017F COLORECTAL CA SCREEN DOC REV: CPT | Performed by: FAMILY MEDICINE

## 2021-02-08 PROCEDURE — 2022F DILAT RTA XM EVC RTNOPTHY: CPT | Performed by: FAMILY MEDICINE

## 2021-02-08 RX ORDER — MONTELUKAST SODIUM 10 MG/1
TABLET ORAL
Qty: 90 TABLET | Refills: 1 | Status: SHIPPED | OUTPATIENT
Start: 2021-02-08 | End: 2021-09-07

## 2021-02-08 RX ORDER — TIZANIDINE 2 MG/1
2 TABLET ORAL EVERY 6 HOURS PRN
COMMUNITY
End: 2021-02-08

## 2021-02-08 NOTE — PROGRESS NOTES
2021    Galina Ayala    Chief Complaint   Patient presents with    6 Month Follow-Up    Other     no new c/o's       HPI    Liudmila Zavala is a 56 y.o. female who presents today with follow-up. Patient is follow-up after her liver kidney transplant. She is now  1 years and 2 months. Her sugars in the morning run 1 12-1 20. She has evening sugars that range from 122 to 140 although they are now at no specific times.       REVIEW OF SYSTEMS    Constitutional:  Denies fever, chills, weight loss or weakness  Eyes:  no photophobia or discharge  ENT:  no sore throat or ear pain  Cardiovascular:  Denies chest pain, palpitations or swelling  Respiratory:  Denies cough or shortness of breath  GI:  no abdominal pain, nausea, vomiting, or diarrhea  Musculoskeletal:  no back pain  Skin:  No rashes  Neurologic:  no headache, focal weakness, or sensory changes  Endocrine:  no polyuria or polydipsia      PAST MEDICAL HISTORY  Past Medical History:   Diagnosis Date    Allergic rhinitis     Cancer ([facility]     melanoma right knee- removed 2016    Chronic kidney disease     \"to start dialysis on 2017- to do every MWF\" follow with Dr Theresa Darling Elevated blood uric acid level     Fibrocystic breast     History of migraine     \"stopped with periods stopped\"    Kidney stone     Malignant melanoma of right knee ([facility]     Osteopenia     Polycystic kidney disease     Post-menopausal     Prolonged emergence from general anesthesia     \"took me awhile to come out of anesthesia with fistula surgery\"    Reactive airway disease     Spinal headache     \"with my first \"       FAMILY HISTORY  Family History   Problem Relation Age of Onset    Cancer Mother         ovarian cancer    Diabetes Mother     High Blood Pressure Father     Kidney Disease Father         PKD- transplant    Kidney Disease Sister     Diabetes Maternal Aunt     Diabetes Maternal Uncle     Kidney Disease Paternal Aunt  Kidney Disease Paternal Uncle     Diabetes Maternal Grandmother     Kidney Disease Paternal Grandfather     Asthma Son        SOCIAL HISTORY  Social History     Socioeconomic History    Marital status: Legally      Spouse name: Not on file    Number of children: Not on file    Years of education: Not on file    Highest education level: Not on file   Occupational History    Not on file   Social Needs    Financial resource strain: Not on file    Food insecurity     Worry: Not on file     Inability: Not on file   [employer] needs     Medical: Not on file     Non-medical: Not on file   Tobacco Use    Smoking status: Never Smoker    Smokeless tobacco: Never Used   Substance and Sexual Activity    Alcohol use: No    Drug use: No    Sexual activity: Not on file   Lifestyle    Physical activity     Days per week: Not on file     Minutes per session: Not on file    Stress: Not on file   Relationships    Social connections     Talks on phone: Not on file     Gets together: Not on file     Attends Worship service: Not on file     Active member of club or organization: Not on file     Attends meetings of clubs or organizations: Not on file     Relationship status: Not on file    Intimate partner violence     Fear of current or ex partner: Not on file     Emotionally abused: Not on file     Physically abused: Not on file     Forced sexual activity: Not on file   Other Topics Concern    Not on file   Social History Narrative    Not on file        SURGICAL HISTORY  Past Surgical History:   Procedure Laterality Date    BREAST LUMPECTOMY Right     \"had several breast bxs in past     SECTION  6125/6150( with BPS)    X2    DIALYSIS FISTULA CREATION  2016    left   [address]    \"behind right ear\" Left side of neck    OTHER SURGICAL HISTORY Bilateral 2017    multiple hepatic cyst drainage in CT w/ Dr Ifeanyi Stewart   [address] [place] [address]  2016 removal of melanoma    TUBAL LIGATION  1985       CURRENT MEDICATIONS  Current Outpatient Medications   Medication Sig Dispense Refill    sertraline (ZOLOFT) 50 MG tablet Take 1 tablet by mouth daily 90 tablet 1    montelukast (SINGULAIR) 10 MG tablet TAKE 1 TABLET BY MOUTH EVERY DAY 90 tablet 1    SITagliptin (JANUVIA) 100 MG tablet Take 1 tablet by mouth daily 90 tablet 1    atorvastatin (LIPITOR) 10 MG tablet Take 1 tablet by mouth daily 90 tablet 5    Calcium Carbonate-Vitamin D (OYSTER SHELL CALCIUM/D) 500-200 MG-UNIT TABS TAKE 1 TABLET BY MOUTH EVERY DAY (Patient taking differently: 1 tablet daily as needed ) 90 tablet 1    tacrolimus (PROGRAF) 1 MG capsule Take 2 mg by mouth 2 times daily      denosumab (PROLIA) 60 MG/ML SOSY SC injection Inject 60 mg into the skin once      dapsone 100 MG tablet Take 100 mg by mouth daily      mycophenolate (MYFORTIC) 180 MG DR tablet Take 180 mg by mouth 2 times daily       aspirin 81 MG tablet Take 81 mg by mouth daily      docusate sodium (COLACE) 100 MG capsule Take 100 mg by mouth daily      acetaminophen (TYLENOL) 500 MG tablet Take 500 mg by mouth as needed for Pain      BIOTIN 5000 PO Take 2 tablets by mouth daily       cetirizine (ZYRTEC) 5 MG tablet Take 10 mg by mouth daily. No current facility-administered medications for this visit. ALLERGIES  Allergies   Allergen Reactions    Pcn [Penicillins] Anaphylaxis     \"have trouble breathing, hives and itching\"    Nsaids     Tape [Adhesive Tape]      Can use the paper tape    Ciprofloxacin Rash    Sulfa Antibiotics Nausea And Vomiting    Tramadol Nausea And Vomiting       PHYSICAL EXAM    /80   Pulse 76   Temp 97.9 °F (36.6 °C)   Ht 5' 1\" (1.549 m)   Wt 152 lb 9.6 oz (69.2 kg)   LMP 01/24/2000   BMI 28.83 kg/m²     ASSESSMENT & PLAN    1. Liver transplant status ([facility]  Stable continue following up transplant team    2.  Kidney transplant status  See #1 3. Type 2 diabetes mellitus without complication, without long-term current use of insulin (HCC)  Excellent control. Patient no longer on insulin. Coached patient to consider decreasing her checks to once a day. If she were to do to in the day she would need to do 2-hour postprandials.  - SITagliptin (JANUVIA) 100 MG tablet; Take 1 tablet by mouth daily  Dispense: 90 tablet; Refill: 1  -  DIABETES FOOT EXAM    4. Recurrent major depressive disorder, in partial remission (HCC)  Issue controlled. Continue meds. Refilled meds. - sertraline (ZOLOFT) 50 MG tablet; Take 1 tablet by mouth daily  Dispense: 90 tablet; Refill: 1    5. Seasonal allergic rhinitis, unspecified trigger  Issue controlled. Continue meds. Refilled meds. - montelukast (SINGULAIR) 10 MG tablet; TAKE 1 TABLET BY MOUTH EVERY DAY  Dispense: 90 tablet;  Refill: 1    Follow-up 6 months or earlier if needed       Electronically signed by Munira Ga MD on 2/8/2021

## 2021-02-15 ENCOUNTER — TELEPHONE (OUTPATIENT)
Dept: FAMILY MEDICINE CLINIC | Age: 61
End: 2021-02-15

## 2021-02-15 DIAGNOSIS — E11.9 TYPE 2 DIABETES MELLITUS WITHOUT COMPLICATION, WITHOUT LONG-TERM CURRENT USE OF INSULIN (HCC): Primary | ICD-10-CM

## 2021-02-15 RX ORDER — PEN NEEDLE, DIABETIC 31 GX5/16"
1 NEEDLE, DISPOSABLE MISCELLANEOUS 2 TIMES DAILY
Qty: 200 EACH | Refills: 1 | Status: SHIPPED | OUTPATIENT
Start: 2021-02-15 | End: 2021-12-01 | Stop reason: SDUPTHER

## 2021-02-15 RX ORDER — GLUCOSAMINE HCL/CHONDROITIN SU 500-400 MG
1 CAPSULE ORAL 2 TIMES DAILY
Qty: 200 STRIP | Refills: 1 | Status: SHIPPED | OUTPATIENT
Start: 2021-02-15 | End: 2021-08-09 | Stop reason: SDUPTHER

## 2021-02-15 RX ORDER — LANCETS 30 GAUGE
1 EACH MISCELLANEOUS 2 TIMES DAILY
Qty: 200 EACH | Refills: 1 | Status: SHIPPED | OUTPATIENT
Start: 2021-02-15 | End: 2021-08-09 | Stop reason: SDUPTHER

## 2021-02-15 NOTE — TELEPHONE ENCOUNTER
DR VÁZQUEZ WROTE A SCRIPT FOR ALCOHOL WIPES ,LANCETS AND TEST STRIPS ALL  ON 1 AND NO DIRECTIONS.   PLEASE ADVISE

## 2021-02-15 NOTE — TELEPHONE ENCOUNTER
Requested Prescriptions     Signed Prescriptions Disp Refills    Alcohol Swabs (ALCOHOL PREP) PADS 200 each 1     Si each by Does not apply route 2 times daily     Authorizing Provider: Tello Joe     Ordering User: PINKY CATHERINE    blood glucose monitor strips 200 strip 1     Si strip by Other route 2 times daily     Authorizing Provider: Tello Joe     Ordering User: PINKY Cao    Lancets MISC 200 each 1     Si each by Does not apply route 2 times daily     Authorizing Provider: Tello Joe     Ordering User: Indra Persaud

## 2021-02-23 ENCOUNTER — TELEPHONE (OUTPATIENT)
Dept: FAMILY MEDICINE CLINIC | Age: 61
End: 2021-02-23

## 2021-02-23 DIAGNOSIS — E11.9 TYPE 2 DIABETES MELLITUS WITHOUT COMPLICATION, WITHOUT LONG-TERM CURRENT USE OF INSULIN (HCC): Primary | ICD-10-CM

## 2021-02-23 NOTE — TELEPHONE ENCOUNTER
Pt returned call stated she spoke with ins who informed her they will actually cover these test strips as long as our office submits prior auth.    Submitted pa via covermymeds

## 2021-02-25 NOTE — TELEPHONE ENCOUNTER
Patient really needs the prior auth done on the test strips (she only has 3 test strips left). If you tammie the Prior Auth \"Urgent\", she can get the strips within 48 hrs.

## 2021-02-26 RX ORDER — BLOOD-GLUCOSE METER
1 KIT MISCELLANEOUS DAILY
Qty: 1 KIT | Refills: 0 | Status: SHIPPED | OUTPATIENT
Start: 2021-02-26

## 2021-02-26 RX ORDER — GLUCOSAMINE HCL/CHONDROITIN SU 500-400 MG
CAPSULE ORAL
Qty: 200 STRIP | Refills: 1 | Status: SHIPPED | OUTPATIENT
Start: 2021-02-26 | End: 2021-12-01 | Stop reason: SDUPTHER

## 2021-02-26 RX ORDER — LANCETS 30 GAUGE
1 EACH MISCELLANEOUS 2 TIMES DAILY
Qty: 200 EACH | Refills: 1 | Status: SHIPPED | OUTPATIENT
Start: 2021-02-26 | End: 2021-12-01 | Stop reason: SDUPTHER

## 2021-02-26 NOTE — TELEPHONE ENCOUNTER
Requested Prescriptions     Signed Prescriptions Disp Refills    glucose monitoring kit (FREESTYLE) monitoring kit 1 kit 0     Si kit by Does not apply route daily     Authorizing Provider: Jorje Greco     Ordering User: PINKY CATHERINE    blood glucose monitor strips 200 strip 1     Sig: Test 2 times a day     Authorizing Provider: Jorje Greco     Ordering User: PINKY Benson    Lancets MISC 200 each 1     Si each by Does not apply route 2 times daily     Authorizing Provider: Jorje Greco     Ordering User: PINKY CATHERINE     Pt informed

## 2021-03-01 ENCOUNTER — TELEPHONE (OUTPATIENT)
Dept: FAMILY MEDICINE CLINIC | Age: 61
End: 2021-03-01

## 2021-03-17 RX ORDER — B-COMPLEX WITH VITAMIN C
TABLET ORAL
Qty: 30 TABLET | Refills: 5 | Status: SHIPPED | OUTPATIENT
Start: 2021-03-17 | End: 2021-05-17 | Stop reason: SDUPTHER

## 2021-05-17 DIAGNOSIS — E11.9 TYPE 2 DIABETES MELLITUS WITHOUT COMPLICATION, WITHOUT LONG-TERM CURRENT USE OF INSULIN (HCC): ICD-10-CM

## 2021-05-17 RX ORDER — B-COMPLEX WITH VITAMIN C
TABLET ORAL
Qty: 30 TABLET | Refills: 5 | Status: SHIPPED | OUTPATIENT
Start: 2021-05-17 | End: 2021-08-09 | Stop reason: SDUPTHER

## 2021-05-17 RX ORDER — DOCUSATE SODIUM 100 MG/1
100 CAPSULE, LIQUID FILLED ORAL DAILY
Qty: 30 CAPSULE | Refills: 5 | Status: SHIPPED | OUTPATIENT
Start: 2021-05-17 | End: 2021-08-09 | Stop reason: SDUPTHER

## 2021-07-08 ENCOUNTER — TELEPHONE (OUTPATIENT)
Dept: FAMILY MEDICINE CLINIC | Age: 61
End: 2021-07-08

## 2021-08-06 ENCOUNTER — TELEPHONE (OUTPATIENT)
Dept: FAMILY MEDICINE CLINIC | Age: 61
End: 2021-08-06

## 2021-08-09 ENCOUNTER — OFFICE VISIT (OUTPATIENT)
Dept: FAMILY MEDICINE CLINIC | Age: 61
End: 2021-08-09
Payer: MEDICAID

## 2021-08-09 VITALS
SYSTOLIC BLOOD PRESSURE: 118 MMHG | HEIGHT: 61 IN | BODY MASS INDEX: 29.47 KG/M2 | HEART RATE: 64 BPM | DIASTOLIC BLOOD PRESSURE: 64 MMHG | OXYGEN SATURATION: 91 % | WEIGHT: 156.1 LBS

## 2021-08-09 DIAGNOSIS — F33.41 RECURRENT MAJOR DEPRESSIVE DISORDER, IN PARTIAL REMISSION (HCC): ICD-10-CM

## 2021-08-09 DIAGNOSIS — I77.0 ARTERIOVENOUS FISTULA (HCC): Primary | ICD-10-CM

## 2021-08-09 DIAGNOSIS — E11.9 TYPE 2 DIABETES MELLITUS WITHOUT COMPLICATION, WITHOUT LONG-TERM CURRENT USE OF INSULIN (HCC): ICD-10-CM

## 2021-08-09 DIAGNOSIS — I15.1 HYPERTENSION SECONDARY TO OTHER RENAL DISORDERS: ICD-10-CM

## 2021-08-09 DIAGNOSIS — N28.89 HYPERTENSION SECONDARY TO OTHER RENAL DISORDERS: ICD-10-CM

## 2021-08-09 DIAGNOSIS — Z94.4 LIVER TRANSPLANT STATUS (HCC): ICD-10-CM

## 2021-08-09 PROBLEM — Z94.89 TRANSPLANT RECIPIENT: Status: RESOLVED | Noted: 2018-11-05 | Resolved: 2021-08-09

## 2021-08-09 PROCEDURE — 3046F HEMOGLOBIN A1C LEVEL >9.0%: CPT | Performed by: FAMILY MEDICINE

## 2021-08-09 PROCEDURE — G8419 CALC BMI OUT NRM PARAM NOF/U: HCPCS | Performed by: FAMILY MEDICINE

## 2021-08-09 PROCEDURE — 99214 OFFICE O/P EST MOD 30 MIN: CPT | Performed by: FAMILY MEDICINE

## 2021-08-09 PROCEDURE — G8427 DOCREV CUR MEDS BY ELIG CLIN: HCPCS | Performed by: FAMILY MEDICINE

## 2021-08-09 PROCEDURE — 3017F COLORECTAL CA SCREEN DOC REV: CPT | Performed by: FAMILY MEDICINE

## 2021-08-09 PROCEDURE — 2022F DILAT RTA XM EVC RTNOPTHY: CPT | Performed by: FAMILY MEDICINE

## 2021-08-09 PROCEDURE — 1036F TOBACCO NON-USER: CPT | Performed by: FAMILY MEDICINE

## 2021-08-09 RX ORDER — B-COMPLEX WITH VITAMIN C
TABLET ORAL
Qty: 30 TABLET | Refills: 5 | Status: SHIPPED | OUTPATIENT
Start: 2021-08-09 | End: 2022-05-24 | Stop reason: SDUPTHER

## 2021-08-09 RX ORDER — GLUCOSAMINE HCL/CHONDROITIN SU 500-400 MG
1 CAPSULE ORAL 2 TIMES DAILY
Qty: 200 STRIP | Refills: 1 | Status: SHIPPED | OUTPATIENT
Start: 2021-08-09 | End: 2021-12-01 | Stop reason: SDUPTHER

## 2021-08-09 RX ORDER — LANCETS 30 GAUGE
1 EACH MISCELLANEOUS 2 TIMES DAILY
Qty: 200 EACH | Refills: 1 | Status: SHIPPED | OUTPATIENT
Start: 2021-08-09 | End: 2022-01-27

## 2021-08-09 RX ORDER — DOCUSATE SODIUM 100 MG/1
100 CAPSULE, LIQUID FILLED ORAL DAILY
Qty: 30 CAPSULE | Refills: 5 | Status: SHIPPED | OUTPATIENT
Start: 2021-08-09 | End: 2021-09-08

## 2021-08-09 NOTE — PROGRESS NOTES
8/9/2021    Brigitte Ruth    Chief Complaint   Patient presents with    6 Month Follow-Up       HPI    Leola is a 56 y.o. female who presents today with follow-up. Patient doing regular blood work. Her last was last week. They are not checking an A1c on her. Patient is willing to ask them to. Patient's last A1c was 4.5 which does not correlate at all with her blood sugars. We will continue to work off of her blood sugars. She does a good job supplying them. She is averaging around 150. Patient's weight is the same as last visit. Patient is unaware of ever being on Glucophage. Probably because her kidneys were slow. Now that her kidney is doing well. It needs production. Glucophage will be helpful.     REVIEW OF SYSTEMS    Constitutional:  Denies fever, chills, weight loss or weakness  Eyes:  no photophobia or discharge  ENT:  no sore throat or ear pain  Cardiovascular:  Denies chest pain, palpitations or swelling  Respiratory:  Denies cough or shortness of breath  GI:  no abdominal pain, nausea, vomiting, or diarrhea  Musculoskeletal:  no back pain  Skin:  No rashes  Neurologic:  no headache, focal weakness, or sensory changes  Endocrine:  no polyuria or polydipsia      PAST MEDICAL HISTORY  Past Medical History:   Diagnosis Date    Allergic rhinitis     Cancer ([address]     melanoma right knee- removed 5/2016    Chronic kidney disease     \"to start dialysis on 7/26/2017- to do every MWF\" follow with Dr Arthur Rolle Elevated blood uric acid level     End stage renal disease ([address] 6/11/2016    Fibrocystic breast     History of migraine     \"stopped with periods stopped\"    Kidney stone     Malignant melanoma of right knee ([address]     Metabolic acidosis 8/09/8281    Osteopenia     Polycystic kidney disease     Post-menopausal     Prolonged emergence from general anesthesia     \"took me awhile to come out of anesthesia with fistula surgery\"    Reactive airway disease     Spinal headache \"with my first \"       FAMILY HISTORY  Family History   Problem Relation Age of Onset   [employer] Cancer Mother         ovarian cancer    Diabetes Mother     High Blood Pressure Father     Kidney Disease Father         PKD- transplant    Kidney Disease Sister     Diabetes Maternal Aunt     Diabetes Maternal Uncle     Kidney Disease Paternal Aunt     Kidney Disease Paternal Uncle     Diabetes Maternal Grandmother     Kidney Disease Paternal Grandfather     Asthma Son        SOCIAL HISTORY  Social History     Socioeconomic History    Marital status: Legally      Spouse name: Not on file    Number of children: Not on file    Years of education: Not on file    Highest education level: Not on file   Occupational History    Not on file   Tobacco Use    Smoking status: Never Smoker    Smokeless tobacco: Never Used   Vaping Use    Vaping Use: Never used   Substance and Sexual Activity    Alcohol use: No    Drug use: No    Sexual activity: Not on file   Other Topics Concern    Not on file   Social History Narrative    Not on file     Social Determinants of Health     Financial Resource Strain:     Difficulty of Paying Living Expenses:    Food Insecurity:     Worried About Running Out of Food in the Last Year:     Ran Out of Food in the Last Year:    Transportation Needs:     Lack of Transportation (Medical):      Lack of Transportation (Non-Medical):    Physical Activity:     Days of Exercise per Week:     Minutes of Exercise per Session:    Stress:     Feeling of Stress :    Social Connections:     Frequency of Communication with Friends and Family:     Frequency of Social Gatherings with Friends and Family:     Attends Muslim Services:     Active Member of Clubs or Organizations:     Attends Club or Organization Meetings:     Marital Status:    Intimate Partner Violence:     Fear of Current or Ex-Partner:     Emotionally Abused:     Physically Abused:     Sexually Abused:         SURGICAL HISTORY  Past Surgical History:   Procedure Laterality Date    BREAST LUMPECTOMY Right     \"had several breast bxs in past     SECTION  5244/1624( with BPS)    X2    DIALYSIS FISTULA CREATION  2016    left    KIDNEY TRANSPLANT      LYMPH NODE BIOPSY      \"behind right ear\" Left side of neck    OTHER SURGICAL HISTORY Bilateral 2017    multiple hepatic cyst drainage in CT w/ Dr Anders Horse SKIN BIOPSY  2016    removal of melanoma    TUBAL LIGATION         CURRENT MEDICATIONS  Current Outpatient Medications   Medication Sig Dispense Refill    sertraline (ZOLOFT) 50 MG tablet Take 1 tablet by mouth daily 90 tablet 1    SITagliptin (JANUVIA) 100 MG tablet Take 1 tablet by mouth daily 90 tablet 1    blood glucose monitor strips 1 strip by Other route 2 times daily 200 strip 1    Lancets MISC 1 each by Does not apply route 2 times daily 200 each 1    metFORMIN (GLUCOPHAGE) 500 MG tablet Take 1 tablet by mouth 2 times daily (with meals) 60 tablet 5    Calcium Carbonate-Vitamin D (OYSTER SHELL CALCIUM/D) 500-200 MG-UNIT TABS TAKE 1 TABLET BY MOUTH EVERY DAY 30 tablet 5    docusate sodium (COLACE) 100 MG capsule Take 1 capsule by mouth daily 30 capsule 5    atorvastatin (LIPITOR) 10 MG tablet Take 1 tablet by mouth daily 90 tablet 3    glucose monitoring kit (FREESTYLE) monitoring kit 1 kit by Does not apply route daily 1 kit 0    blood glucose monitor strips Test 2 times a day 200 strip 1    Alcohol Swabs (ALCOHOL PREP) PADS 1 each by Does not apply route 2 times daily 200 each 1    montelukast (SINGULAIR) 10 MG tablet TAKE 1 TABLET BY MOUTH EVERY DAY 90 tablet 1    tacrolimus (PROGRAF) 1 MG capsule Take 2 mg by mouth 2 times daily      denosumab (PROLIA) 60 MG/ML SOSY SC injection Inject 60 mg into the skin once      dapsone 100 MG tablet Take 100 mg by mouth daily      mycophenolate (MYFORTIC) 180 MG DR tablet Take 180 mg by mouth 2 times daily       aspirin 81 MG tablet Take 81 mg by mouth daily      acetaminophen (TYLENOL) 500 MG tablet Take 500 mg by mouth as needed for Pain      BIOTIN 5000 PO Take 2 tablets by mouth daily       cetirizine (ZYRTEC) 5 MG tablet Take 10 mg by mouth daily.  Lancets MISC 1 each by Does not apply route 2 times daily 200 each 1     No current facility-administered medications for this visit. ALLERGIES  Allergies   Allergen Reactions    Pcn [Penicillins] Anaphylaxis     \"have trouble breathing, hives and itching\"    Nsaids     Tape [Adhesive Tape]      Can use the paper tape    Ciprofloxacin Rash    Sulfa Antibiotics Nausea And Vomiting    Tramadol Nausea And Vomiting       PHYSICAL EXAM  /64   Pulse 64   Ht 5' 1\" (1.549 m)   Wt 156 lb 1.6 oz (70.8 kg)   LMP 01/24/2000   SpO2 91%   BMI 29.49 kg/m²     ASSESSMENT & PLAN    1. Liver transplant status [facility])  Reviewed patient's transplant status. Updated her chart appropriately. 2. Arteriovenous fistula ([facility] [address]  Still in place. Rather large. Transplant people have not talked about removing it yet. 3. Hypertension secondary to other renal disorders  Issue controlled. Continue meds. Refilled meds. - Calcium Carbonate-Vitamin D (OYSTER SHELL CALCIUM/D) 500-200 MG-UNIT TABS; TAKE 1 TABLET BY MOUTH EVERY DAY  Dispense: 30 tablet; Refill: 5    4. Type 2 diabetes mellitus without complication, without long-term current use of insulin (HCC)  Not at goal.    Add Glucophage    - SITagliptin (JANUVIA) 100 MG tablet; Take 1 tablet by mouth daily  Dispense: 90 tablet; Refill: 1  - blood glucose monitor strips; 1 strip by Other route 2 times daily  Dispense: 200 strip; Refill: 1  - Lancets MISC; 1 each by Does not apply route 2 times daily  Dispense: 200 each; Refill: 1  - metFORMIN (GLUCOPHAGE) 500 MG tablet; Take 1 tablet by mouth 2 times daily (with meals)  Dispense: 60 tablet; Refill: 5    5.  Recurrent major depressive disorder, in partial remission ([facility]  Issue controlled. Continue meds. Refilled meds. - sertraline (ZOLOFT) 50 MG tablet; Take 1 tablet by mouth daily  Dispense: 90 tablet;  Refill: 1    Follow-up 4 to 6 months       Electronically signed by Haylie Finch MD on 8/9/2021

## 2021-09-05 DIAGNOSIS — J30.2 SEASONAL ALLERGIC RHINITIS, UNSPECIFIED TRIGGER: ICD-10-CM

## 2021-09-07 RX ORDER — MONTELUKAST SODIUM 10 MG/1
TABLET ORAL
Qty: 90 TABLET | Refills: 1 | Status: SHIPPED | OUTPATIENT
Start: 2021-09-07 | End: 2022-03-21

## 2021-10-06 ENCOUNTER — TELEPHONE (OUTPATIENT)
Dept: FAMILY MEDICINE CLINIC | Age: 61
End: 2021-10-06

## 2021-10-07 NOTE — TELEPHONE ENCOUNTER
Spoke with pt informed have tried to send to [facility] & [facility] without success. Asked pt if she can got to [facility] infusion unit. Pt stated yes.  Faxing form to [facility] infusion unit

## 2021-10-28 ENCOUNTER — TELEPHONE (OUTPATIENT)
Dept: FAMILY MEDICINE CLINIC | Age: 61
End: 2021-10-28

## 2021-10-28 LAB — MAMMOGRAPHY, EXTERNAL: NORMAL

## 2021-10-28 NOTE — TELEPHONE ENCOUNTER
----- Message from Wilbert Burroughs sent at 10/28/2021 12:37 PM EDT -----  Subject: Message to Provider    QUESTIONS  Information for Provider? patient is wanting zahra to reach out to her, she   needs to know what to do about her Prolya shot. patient said it was   approved by insurance and she just wondering what the next steps are so   she can start getting them. Please reach out as soon as possible to go   over that info.   ---------------------------------------------------------------------------  --------------  [address]  What is the best way for the office to contact you? OK to leave message on   voicemail  Preferred Call Back Phone Number? [phone]  ---------------------------------------------------------------------------  --------------  SCRIPT ANSWERS  Relationship to Patient?  Self

## 2021-10-29 PROBLEM — M81.6 LOCALIZED OSTEOPOROSIS: Status: ACTIVE | Noted: 2021-10-29

## 2021-11-04 ENCOUNTER — HOSPITAL ENCOUNTER (OUTPATIENT)
Dept: INFUSION THERAPY | Age: 61
Setting detail: INFUSION SERIES
Discharge: HOME | End: 2021-11-04
Payer: MEDICAID

## 2021-11-04 VITALS
HEART RATE: 64 BPM | TEMPERATURE: 97.3 F | RESPIRATION RATE: 16 BRPM | DIASTOLIC BLOOD PRESSURE: 58 MMHG | SYSTOLIC BLOOD PRESSURE: 118 MMHG | OXYGEN SATURATION: 92 %

## 2021-11-04 LAB
AVERAGE GLUCOSE: NORMAL
HBA1C MFR BLD: 4.6 %

## 2021-11-04 PROCEDURE — 96372 THER/PROPH/DIAG INJ SC/IM: CPT

## 2021-11-04 PROCEDURE — 6360000002 HC RX W HCPCS: Performed by: FAMILY MEDICINE

## 2021-11-04 PROCEDURE — 99211 OFF/OP EST MAY X REQ PHY/QHP: CPT

## 2021-11-04 RX ADMIN — DENOSUMAB 60 MG: 60 INJECTION SUBCUTANEOUS at 11:51

## 2021-11-30 ENCOUNTER — PATIENT MESSAGE (OUTPATIENT)
Dept: FAMILY MEDICINE CLINIC | Age: 61
End: 2021-11-30

## 2021-12-01 ENCOUNTER — OFFICE VISIT (OUTPATIENT)
Dept: FAMILY MEDICINE CLINIC | Age: 61
End: 2021-12-01
Payer: MEDICAID

## 2021-12-01 VITALS
DIASTOLIC BLOOD PRESSURE: 82 MMHG | OXYGEN SATURATION: 91 % | HEART RATE: 63 BPM | BODY MASS INDEX: 29.07 KG/M2 | HEIGHT: 61 IN | SYSTOLIC BLOOD PRESSURE: 130 MMHG | WEIGHT: 154 LBS

## 2021-12-01 DIAGNOSIS — I15.1 HYPERTENSION SECONDARY TO OTHER RENAL DISORDERS: ICD-10-CM

## 2021-12-01 DIAGNOSIS — E11.9 TYPE 2 DIABETES MELLITUS WITHOUT COMPLICATION, WITHOUT LONG-TERM CURRENT USE OF INSULIN (HCC): ICD-10-CM

## 2021-12-01 DIAGNOSIS — B96.89 ACUTE BACTERIAL SINUSITIS: Primary | ICD-10-CM

## 2021-12-01 DIAGNOSIS — N28.89 HYPERTENSION SECONDARY TO OTHER RENAL DISORDERS: ICD-10-CM

## 2021-12-01 DIAGNOSIS — F33.41 RECURRENT MAJOR DEPRESSIVE DISORDER, IN PARTIAL REMISSION (HCC): ICD-10-CM

## 2021-12-01 DIAGNOSIS — Z94.0 KIDNEY TRANSPLANT STATUS: ICD-10-CM

## 2021-12-01 DIAGNOSIS — J01.90 ACUTE BACTERIAL SINUSITIS: Primary | ICD-10-CM

## 2021-12-01 PROCEDURE — 1036F TOBACCO NON-USER: CPT | Performed by: FAMILY MEDICINE

## 2021-12-01 PROCEDURE — G8484 FLU IMMUNIZE NO ADMIN: HCPCS | Performed by: FAMILY MEDICINE

## 2021-12-01 PROCEDURE — 3017F COLORECTAL CA SCREEN DOC REV: CPT | Performed by: FAMILY MEDICINE

## 2021-12-01 PROCEDURE — G8419 CALC BMI OUT NRM PARAM NOF/U: HCPCS | Performed by: FAMILY MEDICINE

## 2021-12-01 PROCEDURE — 3044F HG A1C LEVEL LT 7.0%: CPT | Performed by: FAMILY MEDICINE

## 2021-12-01 PROCEDURE — 99214 OFFICE O/P EST MOD 30 MIN: CPT | Performed by: FAMILY MEDICINE

## 2021-12-01 PROCEDURE — 2022F DILAT RTA XM EVC RTNOPTHY: CPT | Performed by: FAMILY MEDICINE

## 2021-12-01 PROCEDURE — G8427 DOCREV CUR MEDS BY ELIG CLIN: HCPCS | Performed by: FAMILY MEDICINE

## 2021-12-01 RX ORDER — DOXYCYCLINE HYCLATE 100 MG
100 TABLET ORAL 2 TIMES DAILY
Qty: 14 TABLET | Refills: 0 | Status: SHIPPED | OUTPATIENT
Start: 2021-12-01 | End: 2021-12-08

## 2021-12-01 RX ORDER — PEN NEEDLE, DIABETIC 31 GX5/16"
1 NEEDLE, DISPOSABLE MISCELLANEOUS 2 TIMES DAILY
Qty: 200 EACH | Refills: 1 | Status: SHIPPED | OUTPATIENT
Start: 2021-12-01 | End: 2022-09-23

## 2021-12-01 RX ORDER — GLUCOSAMINE HCL/CHONDROITIN SU 500-400 MG
CAPSULE ORAL
Qty: 200 STRIP | Refills: 1 | Status: SHIPPED | OUTPATIENT
Start: 2021-12-01 | End: 2022-01-27

## 2021-12-01 RX ORDER — GLUCOSAMINE HCL/CHONDROITIN SU 500-400 MG
1 CAPSULE ORAL 2 TIMES DAILY
Qty: 200 STRIP | Refills: 1 | Status: SHIPPED | OUTPATIENT
Start: 2021-12-01 | End: 2022-09-23

## 2021-12-01 RX ORDER — LANCETS 30 GAUGE
1 EACH MISCELLANEOUS 2 TIMES DAILY
Qty: 200 EACH | Refills: 1 | Status: SHIPPED | OUTPATIENT
Start: 2021-12-01 | End: 2022-09-23

## 2021-12-01 SDOH — ECONOMIC STABILITY: FOOD INSECURITY: WITHIN THE PAST 12 MONTHS, YOU WORRIED THAT YOUR FOOD WOULD RUN OUT BEFORE YOU GOT MONEY TO BUY MORE.: NEVER TRUE

## 2021-12-01 SDOH — ECONOMIC STABILITY: FOOD INSECURITY: WITHIN THE PAST 12 MONTHS, THE FOOD YOU BOUGHT JUST DIDN'T LAST AND YOU DIDN'T HAVE MONEY TO GET MORE.: NEVER TRUE

## 2021-12-01 ASSESSMENT — SOCIAL DETERMINANTS OF HEALTH (SDOH): HOW HARD IS IT FOR YOU TO PAY FOR THE VERY BASICS LIKE FOOD, HOUSING, MEDICAL CARE, AND HEATING?: NOT HARD AT ALL

## 2021-12-01 NOTE — TELEPHONE ENCOUNTER
From: Jessica Osorio  To: Dr. Biju Shah  Sent: 11/30/2021 1:57 PM EST  Subject: updates    I've been going through my records and have these updated items to add to my information:  Prolia injection - Nov. 4, 2021  Flu vaccine - Oct. 22, 2021  Mammogram - Oct. 28, 2021  Bone Density Axial - March 2019  Colonoscopy - 2018 (sometime before Aug.)  Hep B - Sept 2017 (while on dialysis)  Pneumonia vaccine - 5/7/2017    Have a good afternoon. Thanks!  Steve Sepulveda  Phone [phone] Sept. 17, 1960

## 2021-12-02 NOTE — PROGRESS NOTES
12/1/2021    Bud Pacheco    Chief Complaint   Patient presents with    Other     no c/o's       HPI    Chel Rodriguez is a 59 y.o. female who presents today with follow-up. Patient gets multiple labs from her transplant team she brought last months labs in. Patient agrees that there is a discrepancy between her hemoglobin A1c's and her blood sugars. Her morning blood sugars are never at goal.  Her evenings are not bad. Patient is currently on Metformin 500 twice daily with a good serum creatinine. Discussed with patient that Metformin is not nephrotoxic. Patient notes significant sinus congestion with colored painful drainage recently in the last week. We discussed how that could also raise sugars.         REVIEW OF SYSTEMS    Constitutional:  Denies fever, chills, weight loss or weakness  Eyes:  no photophobia or discharge  ENT:  no sore throat or ear pain  Cardiovascular:  Denies chest pain, palpitations or swelling  Respiratory:  Denies cough or shortness of breath  GI:  no abdominal pain, nausea, vomiting, or diarrhea  Musculoskeletal:  no back pain  Skin:  No rashes  Neurologic:  no headache, focal weakness, or sensory changes  Endocrine:  no polyuria or polydipsia      PAST MEDICAL HISTORY  Past Medical History:   Diagnosis Date    Allergic rhinitis     Cancer ([address]     melanoma right knee- removed 5/2016    Chronic kidney disease     \"to start dialysis on 7/26/2017- to do every MWF\" follow with Dr Justyna Tavares Elevated blood uric acid level     End stage renal disease ([address] 6/11/2016    Fibrocystic breast     History of migraine     \"stopped with periods stopped\"    Kidney stone     Malignant [facility] right [facility]     Metabolic acidosis 0/65/4609    Osteopenia     Polycystic kidney disease     Post-menopausal     Prolonged emergence from general anesthesia     \"took me awhile to come out of anesthesia with fistula surgery\"    Reactive airway disease     Spinal headache \"with my first \"       FAMILY HISTORY  Family History   Problem Relation Age of Onset   Martita Coral Cancer Mother         ovarian cancer    Diabetes Mother     High Blood Pressure Father     Kidney Disease Father         PKD- transplant    Kidney Disease Sister     Diabetes Maternal Aunt     Diabetes Maternal Uncle     Kidney Disease Paternal Aunt     Kidney Disease Paternal Uncle     Diabetes Maternal Grandmother     Kidney Disease Paternal Grandfather     Asthma Son        SOCIAL HISTORY  Social History     Socioeconomic History    Marital status: Single     Spouse name: None    Number of children: None    Years of education: None    Highest education level: None   Occupational History    None   Tobacco Use    Smoking status: Never Smoker    Smokeless tobacco: Never Used   Vaping Use    Vaping Use: Never used   Substance and Sexual Activity    Alcohol use: No    Drug use: No    Sexual activity: None   Other Topics Concern    None   Social History Narrative    None     Social Determinants of Health     Financial Resource Strain: Low Risk     Difficulty of Paying Living Expenses: Not hard at all   Food Insecurity: No Food Insecurity    Worried About Running Out of Food in the Last Year: Never true    Yakov of Food in the Last Year: Never true   Transportation Needs:     Lack of Transportation (Medical): Not on file    Lack of Transportation (Non-Medical):  Not on file   Physical Activity:     Days of Exercise per Week: Not on file    Minutes of Exercise per Session: Not on file   Stress:     Feeling of Stress : Not on file   Social Connections:     Frequency of Communication with Friends and Family: Not on file    Frequency of Social Gatherings with Friends and Family: Not on file    Attends Yarsanism Services: Not on file    Active Member of Clubs or Organizations: Not on file    Attends Club or Organization Meetings: Not on file    Marital Status: Not on file   Intimate Partner Violence:     Fear of Current or Ex-Partner: Not on file    Emotionally Abused: Not on file    Physically Abused: Not on file    Sexually Abused: Not on file   Housing Stability:     Unable to Pay for Housing in the Last Year: Not on file    Number of Lucretia in the Last Year: Not on file    Unstable Housing in the Last Year: Not on file        SURGICAL HISTORY  Past Surgical History:   Procedure Laterality Date    BREAST LUMPECTOMY Right     \"had several breast bxs in past   [address]  5260/7904( with BPS)    X2    DIALYSIS FISTULA CREATION  07/2016    left   [address]    \"behind right ear\" Left side of neck    OTHER SURGICAL HISTORY Bilateral 07/25/2017    multiple hepatic cyst drainage in CT w/ Dr Erik Kirby  05/2016    removal of melanoma    TUBAL LIGATION  1985       CURRENT MEDICATIONS  Current Outpatient Medications   Medication Sig Dispense Refill    sertraline (ZOLOFT) 50 MG tablet Take 1 tablet by mouth daily 90 tablet 1    SITagliptin (JANUVIA) 100 MG tablet Take 1 tablet by mouth daily 90 tablet 1    blood glucose monitor strips 1 strip by Other route 2 times daily 200 strip 1    blood glucose monitor strips Test 2 times a day 200 strip 1    Lancets MISC 1 each by Does not apply route 2 times daily 200 each 1    Alcohol Swabs (ALCOHOL PREP) PADS 1 each by Does not apply route 2 times daily 200 each 1    doxycycline hyclate (VIBRA-TABS) 100 MG tablet Take 1 tablet by mouth 2 times daily for 7 days 14 tablet 0    montelukast (SINGULAIR) 10 MG tablet TAKE 1 TABLET BY MOUTH EVERY DAY 90 tablet 1    Lancets MISC 1 each by Does not apply route 2 times daily 200 each 1    metFORMIN (GLUCOPHAGE) 500 MG tablet Take 1 tablet by mouth 2 times daily (with meals) 60 tablet 5    Calcium Carbonate-Vitamin D (OYSTER SHELL CALCIUM/D) 500-200 MG-UNIT TABS TAKE 1 TABLET BY MOUTH EVERY DAY 30 tablet 5    atorvastatin (LIPITOR) 10 MG tablet Take 1 tablet by mouth daily 90 tablet 3    glucose monitoring kit (FREESTYLE) monitoring kit 1 kit by Does not apply route daily 1 kit 0    tacrolimus (PROGRAF) 1 MG capsule Take 2 mg by mouth 2 times daily      denosumab (PROLIA) 60 MG/ML SOSY SC injection Inject 60 mg into the skin once      dapsone 100 MG tablet Take 100 mg by mouth daily      mycophenolate (MYFORTIC) 180 MG DR tablet Take 180 mg by mouth 2 times daily       aspirin 81 MG tablet Take 81 mg by mouth daily      acetaminophen (TYLENOL) 500 MG tablet Take 500 mg by mouth as needed for Pain      BIOTIN 5000 PO Take 2 tablets by mouth daily       cetirizine (ZYRTEC) 5 MG tablet Take 10 mg by mouth daily. No current facility-administered medications for this visit. ALLERGIES  Allergies   Allergen Reactions    Pcn [Penicillins] Anaphylaxis     \"have trouble breathing, hives and itching\"    Nsaids     Tape [Adhesive Tape]      Can use the paper tape    Ciprofloxacin Rash    Sulfa Antibiotics Nausea And Vomiting    Tramadol Nausea And Vomiting       PHYSICAL EXAM  /82   Pulse 63   Ht 5' 1\" (1.549 m)   Wt 154 lb (69.9 kg)   LMP 01/24/2000   SpO2 91%   BMI 29.10 kg/m²     ASSESSMENT & PLAN    1. Acute bacterial sinusitis  Add antibiotic along with her saline nasal spray    - doxycycline hyclate (VIBRA-TABS) 100 MG tablet; Take 1 tablet by mouth 2 times daily for 7 days  Dispense: 14 tablet; Refill: 0    2. Hypertension secondary to other renal disorders  Issue controlled. Continue meds. Refilled meds. 3. Type 2 diabetes mellitus without complication, without long-term current use of insulin ([facility] [address]  Work up to Metformin 2 pills twice a day over a week's period of time.    - SITagliptin (JANUVIA) 100 MG tablet; Take 1 tablet by mouth daily  Dispense: 90 tablet; Refill: 1  - blood glucose monitor strips; 1 strip by Other route 2 times daily  Dispense: 200 strip;  Refill: 1  - blood glucose monitor strips; Test 2 times a day  Dispense: 200 strip; Refill: 1  - Lancets MISC; 1 each by Does not apply route 2 times daily  Dispense: 200 each; Refill: 1  - Alcohol Swabs (ALCOHOL PREP) PADS; 1 each by Does not apply route 2 times daily  Dispense: 200 each; Refill: 1    4. Kidney transplant status  Issue controlled. Continue meds. Refilled meds. 5. Recurrent major depressive disorder, in partial remission ([facility] [address] [address]  Issue controlled. Continue meds. Refilled meds. - sertraline (ZOLOFT) 50 MG tablet; Take 1 tablet by mouth daily  Dispense: 90 tablet;  Refill: 1    Patient billed off total time30minutes  5 minutes prechart review  20 minutes spent with patient  5 minutes creating note            Electronically signed by Biju Shah MD on 12/1/2021

## 2022-01-27 ENCOUNTER — TELEMEDICINE (OUTPATIENT)
Dept: FAMILY MEDICINE CLINIC | Age: 62
End: 2022-01-27
Payer: MEDICAID

## 2022-01-27 DIAGNOSIS — R68.89 FLU-LIKE SYMPTOMS: Primary | ICD-10-CM

## 2022-01-27 DIAGNOSIS — K59.00 CONSTIPATION, UNSPECIFIED CONSTIPATION TYPE: ICD-10-CM

## 2022-01-27 DIAGNOSIS — J45.901 REACTIVE AIRWAY DISEASE WITH ACUTE EXACERBATION, UNSPECIFIED ASTHMA SEVERITY, UNSPECIFIED WHETHER PERSISTENT: ICD-10-CM

## 2022-01-27 PROCEDURE — 99214 OFFICE O/P EST MOD 30 MIN: CPT | Performed by: PHYSICIAN ASSISTANT

## 2022-01-27 PROCEDURE — 3017F COLORECTAL CA SCREEN DOC REV: CPT | Performed by: PHYSICIAN ASSISTANT

## 2022-01-27 PROCEDURE — G8427 DOCREV CUR MEDS BY ELIG CLIN: HCPCS | Performed by: PHYSICIAN ASSISTANT

## 2022-01-27 RX ORDER — DEXAMETHASONE 6 MG/1
6 TABLET ORAL
Qty: 7 TABLET | Refills: 0 | Status: SHIPPED | OUTPATIENT
Start: 2022-01-27 | End: 2022-02-03

## 2022-01-27 RX ORDER — PSEUDOEPHEDRINE HCL 30 MG
100 TABLET ORAL DAILY
Qty: 60 CAPSULE | Refills: 1 | Status: SHIPPED | OUTPATIENT
Start: 2022-01-27 | End: 2022-05-24 | Stop reason: SDUPTHER

## 2022-01-27 RX ORDER — AZITHROMYCIN 500 MG/1
500 TABLET, FILM COATED ORAL DAILY
Qty: 3 TABLET | Refills: 0 | Status: SHIPPED | OUTPATIENT
Start: 2022-01-27 | End: 2022-01-30

## 2022-01-27 RX ORDER — ALBUTEROL SULFATE 90 UG/1
2 AEROSOL, METERED RESPIRATORY (INHALATION) 4 TIMES DAILY PRN
Qty: 18 G | Refills: 0 | Status: SHIPPED | OUTPATIENT
Start: 2022-01-27 | End: 2022-03-31

## 2022-01-27 ASSESSMENT — ENCOUNTER SYMPTOMS
SORE THROAT: 1
SHORTNESS OF BREATH: 0
WHEEZING: 0
COUGH: 1
SINUS PRESSURE: 1

## 2022-01-27 NOTE — PROGRESS NOTES
2022    TELEHEALTH EVALUATION -- Audio/Visual (During RSFOR-58 public health emergency)    HPI:    Linda Matt (:  1960) has requested an audio/video evaluation for the following concern(s):    Presenting Symptoms: Patient complains of productive cough, sore throat, congestion, pressure in her ears x4 days. Patient states that she felt nauseous  evening, had a sore throat Monday, and she started coughing yesterday. \"Today my ears have started felling really full. \"   Date of Symptom Onset:  evening 22  Fever: Patient states that her temperature has been around 98.8, which she reports is higher than normal for her. \"I' usually about 96 to 97.\"   Loss of Taste or Smell: Denies this. OTC Medications: Patient has been taking Tylenol. Any Known Exposures: Patient visited her son over the weekend, and she daughter in law has been sick with a sinus infection, and her grandson was sick, as well. Tested for COVID: Patient has not been tested yet. History of Smoking: Never smoker  Asthma or COPD: Patient does have a history of reactive airway disease. Wheezing: Denies wheezing  History of HTN: Patient is a kidney and liver transplant recipient. Since her transplant, she has not been hypertensive. Other Risk Factors: Type 2 Diabetes  Received COVID Vaccine: Patient received both Pfizer doses and the booster. Received COVID Booster: Yes  Received Flu Vaccine: Patient received the flu vaccine 2021. Need Work Excuse: Patient does not work outside the home     Review of Systems   Constitutional: Negative for chills and fever. HENT: Positive for sinus pressure and sore throat. Respiratory: Positive for cough. Negative for shortness of breath and wheezing. Prior to Visit Medications    Medication Sig Taking?  Authorizing Provider   docusate (COLACE, DULCOLAX) 100 MG CAPS Take 100 mg by mouth daily Yes Amy Don PA-C   dexamethasone (DECADRON) 6 MG tablet Take 1 tablet by mouth daily (with breakfast) for 7 days Yes Julianne Oconnor PA-C   azithromycin (ZITHROMAX) 500 MG tablet Take 1 tablet by mouth daily for 3 days Yes Julianne Oconnor PA-C   albuterol sulfate HFA (VENTOLIN HFA) 108 (90 Base) MCG/ACT inhaler Inhale 2 puffs into the lungs 4 times daily as needed for Wheezing Yes Julianne Oconnor PA-C   sertraline (ZOLOFT) 50 MG tablet Take 1 tablet by mouth daily Yes Denis Souza MD   SITagliptin (JANUVIA) 100 MG tablet Take 1 tablet by mouth daily Yes Denis Souza MD   blood glucose monitor strips 1 strip by Other route 2 times daily Yes Denis Souza MD   Lancets MISC 1 each by Does not apply route 2 times daily Yes Denis Souza MD   Alcohol Swabs (ALCOHOL PREP) PADS 1 each by Does not apply route 2 times daily Yes Denis Souza MD   metFORMIN (GLUCOPHAGE) 500 MG tablet Take 2 tablets by mouth 2 times daily (with meals) Yes Denis Souza MD   montelukast (SINGULAIR) 10 MG tablet TAKE 1 TABLET BY MOUTH EVERY DAY Yes Denis Souza MD   Calcium Carbonate-Vitamin D (OYSTER SHELL CALCIUM/D) 500-200 MG-UNIT TABS TAKE 1 TABLET BY MOUTH EVERY DAY Yes Denis Souza MD   atorvastatin (LIPITOR) 10 MG tablet Take 1 tablet by mouth daily Yes Kimber Foster MD   glucose monitoring kit (FREESTYLE) monitoring kit 1 kit by Does not apply route daily Yes Denis Souza MD   tacrolimus (PROGRAF) 1 MG capsule Take 2 mg by mouth 2 times daily Yes Lester Tony   denosumab (PROLIA) 60 MG/ML SOSY SC injection Inject 60 mg into the skin once Yes Historical Provider, MD   mycophenolate (MYFORTIC) 180 MG DR tablet Take 180 mg by mouth 2 times daily  Yes Historical Provider, MD   aspirin 81 MG tablet Take 81 mg by mouth daily Yes Historical Provider, MD   acetaminophen (TYLENOL) 500 MG tablet Take 500 mg by mouth as needed for Pain Yes Historical Provider, MD   BIOTIN 5000 PO Take 2 tablets by mouth daily Yes Historical Provider, MD   cetirizine (ZYRTEC) 5 MG tablet Take 10 mg by mouth daily. Yes Historical Provider, MD   blood glucose monitor strips Test 2 times a day  Odell Aceves MD   Lancets MISC 1 each by Does not apply route 2 times daily  Odell Aceves MD       Social History     Tobacco Use    Smoking status: Never Smoker    Smokeless tobacco: Never Used   Vaping Use    Vaping Use: Never used   Substance Use Topics    Alcohol use: No    Drug use: No        PHYSICAL EXAMINATION:    Constitutional: Appears well-developed and well-nourished. No apparent distress    Mental status: Alert and awake, Oriented to person/place/time, Able to follow commands    Eyes: EOM normal, sclera normal, no visible discharge. HENT: Normocephalic, atraumatic. Mouth/Throat: Mucous membranes are moist. External Ears Normal   Neck: No visualized mass   Pulmonary/Chest: Respiratory effort normal. No visualized signs of difficulty breathing or respiratory distress   Musculoskeletal: Normal gait with no signs of ataxia. Normal range of motion of neck  Neurological:No Facial Asymmetry (Cranial nerve 7 motor function). No gaze palsy. Skin: No significant exanthematous lesions or discoloration noted on facial skin  Psychiatric: Normal Affect. No Hallucinations. ASSESSMENT/PLAN:  1. Flu-like symptoms  Instructed the patient to get tested for Covid right away. She can do this at a local pharmacy and send me a SignNow message with the results. Patient is the recipient of a liver and kidney transplant, has a history of asthma, and has other chronic diseases including diabetes so she is high risk. Unfortunately, infusions are no longer available/effective, so they are not being offered. Prescribe Decadron and azithromycin, as well as albuterol. Recommended the patient gargle with salt water and try Chloraseptic or Cepacol lozenges for her sore throat. - dexamethasone (DECADRON) 6 MG tablet;  Take 1 tablet by mouth daily (with breakfast) for 7 days  Dispense: 7 tablet; Refill: 0  - azithromycin (ZITHROMAX) 500 MG tablet; Take 1 tablet by mouth daily for 3 days  Dispense: 3 tablet; Refill: 0  - albuterol sulfate HFA (VENTOLIN HFA) 108 (90 Base) MCG/ACT inhaler; Inhale 2 puffs into the lungs 4 times daily as needed for Wheezing  Dispense: 18 g; Refill: 0    2. Reactive airway disease with acute exacerbation, unspecified asthma severity, unspecified whether persistent  See above (#2).  - dexamethasone (DECADRON) 6 MG tablet; Take 1 tablet by mouth daily (with breakfast) for 7 days  Dispense: 7 tablet; Refill: 0  - azithromycin (ZITHROMAX) 500 MG tablet; Take 1 tablet by mouth daily for 3 days  Dispense: 3 tablet; Refill: 0  - albuterol sulfate HFA (VENTOLIN HFA) 108 (90 Base) MCG/ACT inhaler; Inhale 2 puffs into the lungs 4 times daily as needed for Wheezing  Dispense: 18 g; Refill: 0    3. Constipation, unspecified constipation type  Refilled medication.  - docusate (COLACE, DULCOLAX) 100 MG CAPS; Take 100 mg by mouth daily  Dispense: 60 capsule; Refill: 1      No follow-ups on file. Jessy Andrew, was evaluated through a synchronous (real-time) audio-video encounter. The patient (or guardian if applicable) is aware that this is a billable service. Verbal consent to proceed has been obtained within the past 12 months. The visit was conducted pursuant to the emergency declaration under the [address] [place] [address], [address] [place] [address] authority and the [employer] and [employer] Act. Patient identification was verified, and a caregiver was present when appropriate. The patient was located in a state where the provider was credentialed to provide care. Total time spent on this encounter: 30 minutes    --Dayan Baptiste PA-C on 1/27/2022 at 2:57 PM    An electronic signature was used to authenticate this note.

## 2022-02-16 DIAGNOSIS — E11.9 TYPE 2 DIABETES MELLITUS WITHOUT COMPLICATION, WITHOUT LONG-TERM CURRENT USE OF INSULIN (HCC): ICD-10-CM

## 2022-03-06 DIAGNOSIS — E11.9 TYPE 2 DIABETES MELLITUS WITHOUT COMPLICATION, WITHOUT LONG-TERM CURRENT USE OF INSULIN (HCC): ICD-10-CM

## 2022-03-20 DIAGNOSIS — J30.2 SEASONAL ALLERGIC RHINITIS, UNSPECIFIED TRIGGER: ICD-10-CM

## 2022-03-21 RX ORDER — MONTELUKAST SODIUM 10 MG/1
TABLET ORAL
Qty: 30 TABLET | Refills: 5 | Status: SHIPPED | OUTPATIENT
Start: 2022-03-21 | End: 2022-09-06

## 2022-03-31 ENCOUNTER — OFFICE VISIT (OUTPATIENT)
Dept: FAMILY MEDICINE CLINIC | Age: 62
End: 2022-03-31
Payer: MEDICAID

## 2022-03-31 VITALS
SYSTOLIC BLOOD PRESSURE: 118 MMHG | HEIGHT: 61 IN | DIASTOLIC BLOOD PRESSURE: 66 MMHG | HEART RATE: 59 BPM | BODY MASS INDEX: 28.32 KG/M2 | WEIGHT: 150 LBS | OXYGEN SATURATION: 92 %

## 2022-03-31 DIAGNOSIS — D63.8 ANEMIA OF CHRONIC DISEASE: ICD-10-CM

## 2022-03-31 DIAGNOSIS — E11.9 TYPE 2 DIABETES MELLITUS WITHOUT COMPLICATION, WITHOUT LONG-TERM CURRENT USE OF INSULIN (HCC): ICD-10-CM

## 2022-03-31 DIAGNOSIS — Z94.4 LIVER TRANSPLANT STATUS (HCC): ICD-10-CM

## 2022-03-31 DIAGNOSIS — F33.41 RECURRENT MAJOR DEPRESSIVE DISORDER, IN PARTIAL REMISSION (HCC): Primary | ICD-10-CM

## 2022-03-31 DIAGNOSIS — J30.2 SEASONAL ALLERGIC RHINITIS, UNSPECIFIED TRIGGER: ICD-10-CM

## 2022-03-31 PROCEDURE — 99214 OFFICE O/P EST MOD 30 MIN: CPT | Performed by: FAMILY MEDICINE

## 2022-03-31 PROCEDURE — 1036F TOBACCO NON-USER: CPT | Performed by: FAMILY MEDICINE

## 2022-03-31 PROCEDURE — G8427 DOCREV CUR MEDS BY ELIG CLIN: HCPCS | Performed by: FAMILY MEDICINE

## 2022-03-31 PROCEDURE — G8484 FLU IMMUNIZE NO ADMIN: HCPCS | Performed by: FAMILY MEDICINE

## 2022-03-31 PROCEDURE — 3017F COLORECTAL CA SCREEN DOC REV: CPT | Performed by: FAMILY MEDICINE

## 2022-03-31 PROCEDURE — 2022F DILAT RTA XM EVC RTNOPTHY: CPT | Performed by: FAMILY MEDICINE

## 2022-03-31 PROCEDURE — 3046F HEMOGLOBIN A1C LEVEL >9.0%: CPT | Performed by: FAMILY MEDICINE

## 2022-03-31 PROCEDURE — G8419 CALC BMI OUT NRM PARAM NOF/U: HCPCS | Performed by: FAMILY MEDICINE

## 2022-03-31 RX ORDER — DULAGLUTIDE 0.75 MG/.5ML
0.75 INJECTION, SOLUTION SUBCUTANEOUS WEEKLY
Qty: 4 PEN | Refills: 5 | Status: SHIPPED | OUTPATIENT
Start: 2022-03-31 | End: 2022-09-23 | Stop reason: SDUPTHER

## 2022-03-31 RX ORDER — CLOBETASOL PROPIONATE 0.5 MG/G
OINTMENT TOPICAL 2 TIMES DAILY
COMMUNITY

## 2022-03-31 ASSESSMENT — PATIENT HEALTH QUESTIONNAIRE - PHQ9
SUM OF ALL RESPONSES TO PHQ QUESTIONS 1-9: 3
10. IF YOU CHECKED OFF ANY PROBLEMS, HOW DIFFICULT HAVE THESE PROBLEMS MADE IT FOR YOU TO DO YOUR WORK, TAKE CARE OF THINGS AT HOME, OR GET ALONG WITH OTHER PEOPLE: 0
SUM OF ALL RESPONSES TO PHQ9 QUESTIONS 1 & 2: 0
7. TROUBLE CONCENTRATING ON THINGS, SUCH AS READING THE NEWSPAPER OR WATCHING TELEVISION: 0
1. LITTLE INTEREST OR PLEASURE IN DOING THINGS: 0
SUM OF ALL RESPONSES TO PHQ QUESTIONS 1-9: 3
4. FEELING TIRED OR HAVING LITTLE ENERGY: 0
9. THOUGHTS THAT YOU WOULD BE BETTER OFF DEAD, OR OF HURTING YOURSELF: 0
5. POOR APPETITE OR OVEREATING: 0
SUM OF ALL RESPONSES TO PHQ QUESTIONS 1-9: 3
8. MOVING OR SPEAKING SO SLOWLY THAT OTHER PEOPLE COULD HAVE NOTICED. OR THE OPPOSITE, BEING SO FIGETY OR RESTLESS THAT YOU HAVE BEEN MOVING AROUND A LOT MORE THAN USUAL: 0
3. TROUBLE FALLING OR STAYING ASLEEP: 3
SUM OF ALL RESPONSES TO PHQ QUESTIONS 1-9: 3
2. FEELING DOWN, DEPRESSED OR HOPELESS: 0
6. FEELING BAD ABOUT YOURSELF - OR THAT YOU ARE A FAILURE OR HAVE LET YOURSELF OR YOUR FAMILY DOWN: 0

## 2022-04-01 NOTE — PROGRESS NOTES
4/1/2022    Ismael Walker    Chief Complaint   Patient presents with    Other     4 mth f/u    Elbow Pain     left elbow pain    Other     trulicity teaching. explained how to telf inject trulicity. pt demontrated understanding by self injecting 0.75mg without c/o's       HPI    Hugo Calle is a 59 y.o. female who presents today with follow-up. Kimberlee Parra is down 5 pounds in 3 months. Patient was to have an interfering substance as her A1c is read in the 4.9 she had her blood sugars run in the 160s and 170s. Patient with history of anemia is. Her last hemoglobin 11.9. Patient currently being seen regularly and getting regular labs from her transplant team.  She has transplanted kidney kidneys and liver. Patient notes that 4 pills of Metformin caused her diarrhea. She is back down to either 2 or 3 daily.     REVIEW OF SYSTEMS    Constitutional:  Denies fever, chills, weight loss or weakness  Eyes:  no photophobia or discharge  ENT:  no sore throat or ear pain  Cardiovascular:  Denies chest pain, palpitations or swelling  Respiratory:  Denies cough or shortness of breath  GI: See above  Musculoskeletal:  no back pain  Skin:  No rashes  Neurologic:  no headache, focal weakness, or sensory changes  Endocrine:  no polyuria or polydipsia      PAST MEDICAL HISTORY  Past Medical History:   Diagnosis Date    Allergic rhinitis     Cancer ([address]     melanoma right knee- removed 5/2016    Chronic kidney disease     \"to start dialysis on 7/26/2017- to do every MWF\" follow with Dr Evi Schmidt Elevated blood uric acid level     End stage renal disease ([address] 6/11/2016    Fibrocystic breast     History of migraine     \"stopped with periods stopped\"    Kidney stone     Malignant melanoma of right knee ([address]     Metabolic acidosis 9/63/6820    Osteopenia     Polycystic kidney disease     Post-menopausal     Prolonged emergence from general anesthesia     \"took me awhile to come out of anesthesia with fistula surgery\"    Reactive airway disease     Spinal headache     \"with my first \"       FAMILY HISTORY  Family History   Problem Relation Age of Onset   Stephenie Maciel Cancer Mother         ovarian cancer    Diabetes Mother     High Blood Pressure Father     Kidney Disease Father         PKD- transplant    Kidney Disease Sister     Diabetes Maternal Aunt     Diabetes Maternal Uncle     Kidney Disease Paternal Aunt     Kidney Disease Paternal Uncle     Diabetes Maternal Grandmother     Kidney Disease Paternal Grandfather     Asthma Son        SOCIAL HISTORY  Social History     Socioeconomic History    Marital status: Single     Spouse name: Not on file    Number of children: Not on file    Years of education: Not on file    Highest education level: Not on file   Occupational History    Not on file   Tobacco Use    Smoking status: Never Smoker    Smokeless tobacco: Never Used   Vaping Use    Vaping Use: Never used   Substance and Sexual Activity    Alcohol use: No    Drug use: No    Sexual activity: Not on file   Other Topics Concern    Not on file   Social History Narrative    Not on file     Social Determinants of Health     Financial Resource Strain: Low Risk     Difficulty of Paying Living Expenses: Not hard at all   Food Insecurity: No Food Insecurity    Worried About Running Out of Food in the Last Year: Never true    Yakov of Food in the Last Year: Never true   Transportation Needs:     Lack of Transportation (Medical): Not on file    Lack of Transportation (Non-Medical):  Not on file   Physical Activity:     Days of Exercise per Week: Not on file    Minutes of Exercise per Session: Not on file   Stress:     Feeling of Stress : Not on file   Social Connections:     Frequency of Communication with Friends and Family: Not on file    Frequency of Social Gatherings with Friends and Family: Not on file    Attends Caodaism Services: Not on file   [employer]s or Organizations: Not on file    Attends Club or Organization Meetings: Not on file    Marital Status: Not on file   Intimate Partner Violence:     Fear of Current or Ex-Partner: Not on file    Emotionally Abused: Not on file    Physically Abused: Not on file    Sexually Abused: Not on file   Housing Stability:     Unable to Pay for Housing in the Last Year: Not on file    Number of [place] in the Last Year: Not on file    Unstable Housing in the Last Year: Not on file        SURGICAL HISTORY  Past Surgical History:   Procedure Laterality Date    BREAST LUMPECTOMY Right     \"had several breast bxs in past     SECTION  9213/1280( with BPS)    X2    DIALYSIS FISTULA CREATION  2016    left   [address]    \"behind right ear\" Left side of neck    OTHER SURGICAL HISTORY Bilateral 2017    multiple hepatic cyst drainage in CT w/ Dr Antony Argue  2016    removal of melanoma    TUBAL LIGATION  1985       CURRENT MEDICATIONS  Current Outpatient Medications   Medication Sig Dispense Refill    clobetasol (TEMOVATE) 0.05 % ointment Apply topically 2 times daily Apply topically 2 times daily.       metFORMIN (GLUCOPHAGE) 500 MG tablet Take 2 in am and 1 in pm with meals 90 tablet 5    Dulaglutide (TRULICITY) 7.32 FW/5.9VT SOPN Inject 0.75 mg into the skin once a week 4 pen 5    montelukast (SINGULAIR) 10 MG tablet TAKE 1 TABLET BY MOUTH EVERY DAY 30 tablet 5    docusate (COLACE, DULCOLAX) 100 MG CAPS Take 100 mg by mouth daily 60 capsule 1    sertraline (ZOLOFT) 50 MG tablet Take 1 tablet by mouth daily 90 tablet 1    blood glucose monitor strips 1 strip by Other route 2 times daily 200 strip 1    Lancets MISC 1 each by Does not apply route 2 times daily 200 each 1    Alcohol Swabs (ALCOHOL PREP) PADS 1 each by Does not apply route 2 times daily 200 each 1    Calcium Carbonate-Vitamin D (OYSTER SHELL CALCIUM/D) 500-200 MG-UNIT TABS TAKE 1 TABLET BY MOUTH EVERY DAY 30 tablet 5    atorvastatin (LIPITOR) 10 MG tablet Take 1 tablet by mouth daily 90 tablet 3    glucose monitoring kit (FREESTYLE) monitoring kit 1 kit by Does not apply route daily 1 kit 0    tacrolimus (PROGRAF) 1 MG capsule Take 2 mg by mouth 2 times daily      denosumab (PROLIA) 60 MG/ML SOSY SC injection Inject 60 mg into the skin once      mycophenolate (MYFORTIC) 180 MG DR tablet Take 180 mg by mouth 2 times daily       aspirin 81 MG tablet Take 81 mg by mouth daily      acetaminophen (TYLENOL) 500 MG tablet Take 500 mg by mouth as needed for Pain      BIOTIN 5000 PO Take 2 tablets by mouth daily       cetirizine (ZYRTEC) 5 MG tablet Take 10 mg by mouth daily. No current facility-administered medications for this visit. ALLERGIES  Allergies   Allergen Reactions    Pcn [Penicillins] Anaphylaxis     \"have trouble breathing, hives and itching\"    Nsaids     Tape [Adhesive Tape]      Can use the paper tape    Ciprofloxacin Rash    Sulfa Antibiotics Nausea And Vomiting    Tramadol Nausea And Vomiting       PHYSICAL EXAM  /66   Pulse 59   Ht 5' 1\" (1.549 m)   Wt 150 lb (68 kg)   LMP 01/24/2000   SpO2 92%   BMI 28.34 kg/m²     ASSESSMENT & PLAN    1. Liver transplant status ([facility]  Issue controlled. Continue meds. Refilled meds. 2. Recurrent major depressive disorder, in partial remission ([address]  Patient's moods are doing better. She wishes to remain on her current medication. 3. Anemia of chronic disease  Mild anemia currently stable probably related to mild renal insufficiency plus or minus antirejection medication. 4. Type 2 diabetes mellitus without complication, without long-term current use of insulin ([address]  Needs more help  Stop Januvia  Start Trulicity 8.10 mg daily patient get first injection in office today. Decrease Metformin from 4-2 or 3 pills daily  . -  DIABETES FOOT EXAM  - metFORMIN (GLUCOPHAGE) 500 MG tablet;  Take 2 in am and 1 in pm with meals  Dispense: 90 tablet; Refill: 5  - Dulaglutide (TRULICITY) 7.03 VX/8.1IF SOPN; Inject 0.75 mg into the skin once a week  Dispense: 4 pen; Refill: 5    5. Seasonal allergic rhinitis, unspecified trigger  Issue controlled. Continue meds. Refilled meds. Patient billed off total time-30minutes  5 minutes prechart review  17 minutes spent with patient  8 minutes creating note     Follow-up in 7 weeks due to starting GLP-1. Remembering that her A1c's do not seem to correlate with sugars.        Electronically signed by Jose Alberto Marin MD on 4/1/2022

## 2022-04-07 ENCOUNTER — HOSPITAL ENCOUNTER (OUTPATIENT)
Age: 62
Setting detail: SPECIMEN
Discharge: HOME | End: 2022-04-07
Payer: MEDICAID

## 2022-04-07 ENCOUNTER — OFFICE VISIT (OUTPATIENT)
Dept: OBGYN | Age: 62
End: 2022-04-07
Payer: MEDICAID

## 2022-04-07 VITALS
SYSTOLIC BLOOD PRESSURE: 126 MMHG | DIASTOLIC BLOOD PRESSURE: 68 MMHG | BODY MASS INDEX: 28.51 KG/M2 | WEIGHT: 151 LBS | HEIGHT: 61 IN

## 2022-04-07 DIAGNOSIS — Z11.51 SPECIAL SCREENING EXAMINATION FOR HUMAN PAPILLOMAVIRUS (HPV): ICD-10-CM

## 2022-04-07 DIAGNOSIS — Z13.820 ENCOUNTER FOR OSTEOPOROSIS SCREENING IN ASYMPTOMATIC POSTMENOPAUSAL PATIENT: ICD-10-CM

## 2022-04-07 DIAGNOSIS — Z78.0 ENCOUNTER FOR OSTEOPOROSIS SCREENING IN ASYMPTOMATIC POSTMENOPAUSAL PATIENT: ICD-10-CM

## 2022-04-07 DIAGNOSIS — Z12.31 SCREENING MAMMOGRAM FOR BREAST CANCER: ICD-10-CM

## 2022-04-07 DIAGNOSIS — Z01.419 ENCOUNTER FOR ANNUAL ROUTINE GYNECOLOGICAL EXAMINATION: Primary | ICD-10-CM

## 2022-04-07 PROCEDURE — 88142 CYTOPATH C/V THIN LAYER: CPT

## 2022-04-07 PROCEDURE — 87624 HPV HI-RISK TYP POOLED RSLT: CPT

## 2022-04-07 PROCEDURE — 99386 PREV VISIT NEW AGE 40-64: CPT | Performed by: OBSTETRICS & GYNECOLOGY

## 2022-04-07 RX ORDER — ONDANSETRON 2 MG/ML
8 INJECTION INTRAMUSCULAR; INTRAVENOUS
Status: CANCELLED | OUTPATIENT
Start: 2022-05-06

## 2022-04-07 RX ORDER — EPINEPHRINE 1 MG/ML
0.3 INJECTION, SOLUTION, CONCENTRATE INTRAVENOUS PRN
Status: CANCELLED | OUTPATIENT
Start: 2022-05-06

## 2022-04-07 RX ORDER — SODIUM CHLORIDE 9 MG/ML
INJECTION, SOLUTION INTRAVENOUS CONTINUOUS
Status: CANCELLED | OUTPATIENT
Start: 2022-05-06

## 2022-04-07 RX ORDER — DIPHENHYDRAMINE HYDROCHLORIDE 50 MG/ML
50 INJECTION INTRAMUSCULAR; INTRAVENOUS
Status: CANCELLED | OUTPATIENT
Start: 2022-05-06

## 2022-04-07 RX ORDER — ALBUTEROL SULFATE 90 UG/1
4 AEROSOL, METERED RESPIRATORY (INHALATION) PRN
Status: CANCELLED | OUTPATIENT
Start: 2022-05-06

## 2022-04-07 RX ORDER — ACETAMINOPHEN 325 MG/1
650 TABLET ORAL
Status: CANCELLED | OUTPATIENT
Start: 2022-05-06

## 2022-04-07 ASSESSMENT — ENCOUNTER SYMPTOMS
ALLERGIC/IMMUNOLOGIC NEGATIVE: 1
GASTROINTESTINAL NEGATIVE: 1
RESPIRATORY NEGATIVE: 1
EYES NEGATIVE: 1

## 2022-04-07 NOTE — PROGRESS NOTES
22    Dallie Lanes  1960    Chief Complaint   Patient presents with    Gynecologic Exam     postmenopausal, No HRT, is not sexually active, Last pap smear was 2018 normal, Last mammo was  normal, Last dexa was  showed osteoporosis on prolia, Last colonoscopy was 2018 normal         The patient is a 59 y.o. female,  who presents for her annual exam.  She is menopausal.  She is not taking HRT. [place] She is  sexually active. She reports no gynecological symptoms. Pap smear history: Her last PAP smear was in 2018. Her results were normal.    Breast history: her most recent mammogram was in . The results were: Normal    Osteoporosis Status: her bone density scan was in 2018. The results were osteoporosis - treatment: Prolia    Colonoscopy Status: she had a colonoscopy in 2018.   The results were normal.    Past Medical History:   Diagnosis Date    Allergic rhinitis     Cancer ([facility]     melanoma right knee- removed 2016    Chronic kidney disease     \"to start dialysis on 2017- to do every MWF\" follow with Dr Eunice Gayle Elevated blood uric acid level     End stage renal disease ([address] 2016    Fibrocystic breast     History of migraine     \"stopped with periods stopped\"    Kidney stone     Malignant melanoma of right knee ([facility]     Metabolic acidosis 8695    Osteopenia     Polycystic kidney disease     Post-menopausal     Prolonged emergence from general anesthesia     \"took me awhile to come out of anesthesia with fistula surgery\"    Reactive airway disease     Spinal headache     \"with my first \"       Past Surgical History:   Procedure Laterality Date    BREAST LUMPECTOMY Right     \"had several breast bxs in past     SECTION  6574/9673( with BPS)    X2    DIALYSIS FISTULA CREATION  2016    left    KIDNEY TRANSPLANT      LIVER TRANSPLANT      LYMPH NODE BIOPSY      \"behind right ear\" Left side of neck    OTHER SURGICAL HISTORY Bilateral 07/25/2017    multiple hepatic cyst drainage in CT w/ Dr Lorna Geurra SKIN BIOPSY  05/2016    removal of melanoma    TUBAL LIGATION  1985       Family History   Problem Relation Age of Onset    Cancer Mother         ovarian cancer    Diabetes Mother     High Blood Pressure Father     Kidney Disease Father         PKD- transplant    Kidney Disease Sister     Diabetes Maternal Aunt     Diabetes Maternal Uncle     Kidney Disease Paternal Aunt     Kidney Disease Paternal Uncle     Diabetes Maternal Grandmother     Kidney Disease Paternal Grandfather     Asthma Son        Social History     Tobacco Use    Smoking status: Never Smoker    Smokeless tobacco: Never Used   Vaping Use    Vaping Use: Never used   Substance Use Topics    Alcohol use: No    Drug use: No       Current Outpatient Medications   Medication Sig Dispense Refill    clobetasol (TEMOVATE) 0.05 % ointment Apply topically 2 times daily Apply topically 2 times daily.       metFORMIN (GLUCOPHAGE) 500 MG tablet Take 2 in am and 1 in pm with meals 90 tablet 5    Dulaglutide (TRULICITY) 2.00 GA/9.5SM SOPN Inject 0.75 mg into the skin once a week 4 pen 5    montelukast (SINGULAIR) 10 MG tablet TAKE 1 TABLET BY MOUTH EVERY DAY 30 tablet 5    docusate (COLACE, DULCOLAX) 100 MG CAPS Take 100 mg by mouth daily 60 capsule 1    sertraline (ZOLOFT) 50 MG tablet Take 1 tablet by mouth daily 90 tablet 1    blood glucose monitor strips 1 strip by Other route 2 times daily 200 strip 1    Lancets MISC 1 each by Does not apply route 2 times daily 200 each 1    Alcohol Swabs (ALCOHOL PREP) PADS 1 each by Does not apply route 2 times daily 200 each 1    Calcium Carbonate-Vitamin D (OYSTER SHELL CALCIUM/D) 500-200 MG-UNIT TABS TAKE 1 TABLET BY MOUTH EVERY DAY 30 tablet 5    atorvastatin (LIPITOR) 10 MG tablet Take 1 tablet by mouth daily 90 tablet 3    glucose monitoring kit (FREESTYLE) monitoring kit 1 kit by Does not apply route daily 1 kit 0    tacrolimus (PROGRAF) 1 MG capsule Take 2 mg by mouth 2 times daily      denosumab (PROLIA) 60 MG/ML SOSY SC injection Inject 60 mg into the skin once      mycophenolate (MYFORTIC) 180 MG DR tablet Take 180 mg by mouth 2 times daily       aspirin 81 MG tablet Take 81 mg by mouth daily      acetaminophen (TYLENOL) 500 MG tablet Take 500 mg by mouth as needed for Pain      BIOTIN 5000 PO Take 2 tablets by mouth daily       cetirizine (ZYRTEC) 5 MG tablet Take 10 mg by mouth daily. No current facility-administered medications for this visit. Allergies   Allergen Reactions    Pcn [Penicillins] Anaphylaxis     \"have trouble breathing, hives and itching\"    Nsaids     Tape [Adhesive Tape]      Can use the paper tape    Ciprofloxacin Rash    Sulfa Antibiotics Nausea And Vomiting    Tramadol Nausea And Vomiting           Immunization History   Administered Date(s) Administered    COVID-19, Pfizer Purple top, DILUTE for use, 12+ yrs, 30mcg/0.3mL dose 2021, 2021, 2021    Hepatitis B Ped/Adol (Engerix-B, Recombivax HB) 2017    Influenza Vaccine, unspecified formulation 2012    Influenza Virus Vaccine 2012, 2012, 2014, 10/05/2015, 10/10/2016, 2017, 2019, 10/31/2019    Influenza Whole 10/10/2020, 10/22/2021    Influenza, Intradermal, Preservative free 10/12/2020    Influenza, Quadv, IM, PF (6 mo and older Fluzone, Flulaval, Fluarix, and 3 yrs and older Afluria) 2014, 10/31/2019    Pneumococcal Conjugate 13-valent ([id number]) 2015, 2017    Pneumococcal Polysaccharide ([id number]) 2017    Tdap (Boostrix, Adacel) 2012       Review of Systems   Constitutional: Negative. Eyes: Negative. Respiratory: Negative. Cardiovascular: Negative. Gastrointestinal: Negative. Endocrine: Negative. Genitourinary: Negative. Musculoskeletal: Negative. Skin: Negative. Allergic/Immunologic: Negative. Neurological: Negative. Hematological: Negative. Psychiatric/Behavioral: Negative. /68   Ht 5' 1\" (1.549 m)   Wt 151 lb (68.5 kg)   LMP 01/24/2000   BMI 28.53 kg/m²     Physical Exam  Exam conducted with a chaperone present. Constitutional:       Appearance: Normal appearance. HENT:      Head: Normocephalic and atraumatic. Nose: Nose normal.   Eyes:      Conjunctiva/sclera: Conjunctivae normal.   Cardiovascular:      Rate and Rhythm: Normal rate. Pulses: Normal pulses. Pulmonary:      Effort: Pulmonary effort is normal.   Chest:   Breasts:      Right: Normal. No axillary adenopathy or supraclavicular adenopathy. Left: Normal. No axillary adenopathy or supraclavicular adenopathy. Abdominal:      General: Abdomen is flat. Bowel sounds are normal.      Palpations: Abdomen is soft. Hernia: There is no hernia in the left inguinal area or right inguinal area. Genitourinary:     General: Normal vulva. Exam position: Lithotomy position. Labia:         Right: No rash, tenderness or lesion. Left: No rash, tenderness or lesion. Urethra: No prolapse. Vagina: Normal. No foreign body. No vaginal discharge, erythema, tenderness, bleeding, lesions or prolapsed vaginal walls. Cervix: No cervical motion tenderness, discharge, friability, lesion, erythema or cervical bleeding. Uterus: Normal. Not enlarged, not tender and no uterine prolapse. Adnexa: Right adnexa normal and left adnexa normal.        Right: No mass, tenderness or fullness. Left: No mass, tenderness or fullness. Musculoskeletal:      Cervical back: Normal range of motion and neck supple. Lymphadenopathy:      Upper Body:      Right upper body: No supraclavicular, axillary or pectoral adenopathy. Left upper body: No supraclavicular, axillary or pectoral adenopathy. Skin:     General: Skin is warm. Coloration: Skin is not ashen or cyanotic. Findings: No abrasion, abscess or bruising. Rash is not crusting or macular. Neurological:      Mental Status: She is alert and oriented to person, place, and time. No results found for this visit on 04/07/22. Assessment and Plan   Diagnosis Orders   1. Encounter for annual routine gynecological examination  ALVA DIGITAL SCREEN W OR WO CAD BILATERAL    DEXA BONE DENSITY AXIAL SKELETON    PAP SMEAR   2. Screening mammogram for breast cancer  ALVA DIGITAL SCREEN W OR WO CAD BILATERAL   3. Encounter for osteoporosis screening in asymptomatic postmenopausal patient  DEXA BONE DENSITY AXIAL SKELETON   4. Special screening examination for human papillomavirus (HPV)  PAP SMEAR       Return in about 1 year (around 4/7/2023).     Chavez Bautista MD

## 2022-04-12 LAB
HPV HIGH RISK: NOT DETECTED
HPV, GENOTYPE 16: NOT DETECTED
HPV, GENOTYPE 18: NOT DETECTED

## 2022-05-06 ENCOUNTER — HOSPITAL ENCOUNTER (OUTPATIENT)
Dept: INFUSION THERAPY | Age: 62
Setting detail: INFUSION SERIES
Discharge: HOME | End: 2022-05-06
Payer: MEDICAID

## 2022-05-06 VITALS
DIASTOLIC BLOOD PRESSURE: 64 MMHG | HEART RATE: 63 BPM | SYSTOLIC BLOOD PRESSURE: 124 MMHG | RESPIRATION RATE: 18 BRPM | TEMPERATURE: 97.3 F

## 2022-05-06 DIAGNOSIS — M81.6 LOCALIZED OSTEOPOROSIS WITHOUT CURRENT PATHOLOGICAL FRACTURE: Primary | ICD-10-CM

## 2022-05-06 PROCEDURE — 99211 OFF/OP EST MAY X REQ PHY/QHP: CPT

## 2022-05-06 PROCEDURE — 6360000002 HC RX W HCPCS

## 2022-05-06 PROCEDURE — 96372 THER/PROPH/DIAG INJ SC/IM: CPT

## 2022-05-06 RX ORDER — ACETAMINOPHEN 325 MG/1
650 TABLET ORAL
Status: CANCELLED | OUTPATIENT
Start: 2022-05-06

## 2022-05-06 RX ORDER — FAMOTIDINE 10 MG/ML
20 INJECTION, SOLUTION INTRAVENOUS
Status: CANCELLED | OUTPATIENT
Start: 2022-05-06

## 2022-05-06 RX ORDER — DIPHENHYDRAMINE HYDROCHLORIDE 50 MG/ML
50 INJECTION INTRAMUSCULAR; INTRAVENOUS
Status: CANCELLED | OUTPATIENT
Start: 2022-05-06

## 2022-05-06 RX ORDER — ONDANSETRON 2 MG/ML
8 INJECTION INTRAMUSCULAR; INTRAVENOUS
Status: CANCELLED | OUTPATIENT
Start: 2022-05-06

## 2022-05-06 RX ORDER — ALBUTEROL SULFATE 90 UG/1
4 AEROSOL, METERED RESPIRATORY (INHALATION) PRN
Status: CANCELLED | OUTPATIENT
Start: 2022-05-06

## 2022-05-06 RX ORDER — SODIUM CHLORIDE 9 MG/ML
INJECTION, SOLUTION INTRAVENOUS CONTINUOUS
Status: CANCELLED | OUTPATIENT
Start: 2022-05-06

## 2022-05-06 RX ORDER — EPINEPHRINE 1 MG/ML
0.3 INJECTION, SOLUTION, CONCENTRATE INTRAVENOUS PRN
Status: CANCELLED | OUTPATIENT
Start: 2022-05-06

## 2022-05-06 RX ADMIN — DENOSUMAB 60 MG: 60 INJECTION SUBCUTANEOUS at 11:15

## 2022-05-06 NOTE — PROGRESS NOTES
Pt taken to room 00 for prolia. Pt oriented to room, call light, bed/chair controls, TV, pt voiced understanding. Plan of care explained to pt, pt voiced understanding.

## 2022-05-24 ENCOUNTER — OFFICE VISIT (OUTPATIENT)
Dept: FAMILY MEDICINE CLINIC | Age: 62
End: 2022-05-24
Payer: MEDICAID

## 2022-05-24 VITALS
OXYGEN SATURATION: 89 % | SYSTOLIC BLOOD PRESSURE: 120 MMHG | BODY MASS INDEX: 27.17 KG/M2 | RESPIRATION RATE: 16 BRPM | WEIGHT: 143.9 LBS | DIASTOLIC BLOOD PRESSURE: 60 MMHG | HEIGHT: 61 IN | HEART RATE: 61 BPM

## 2022-05-24 DIAGNOSIS — E11.9 TYPE 2 DIABETES MELLITUS WITHOUT COMPLICATION, WITHOUT LONG-TERM CURRENT USE OF INSULIN (HCC): Primary | ICD-10-CM

## 2022-05-24 DIAGNOSIS — E78.2 MIXED HYPERLIPIDEMIA: ICD-10-CM

## 2022-05-24 DIAGNOSIS — F33.41 RECURRENT MAJOR DEPRESSIVE DISORDER, IN PARTIAL REMISSION (HCC): ICD-10-CM

## 2022-05-24 DIAGNOSIS — I15.1 HYPERTENSION SECONDARY TO OTHER RENAL DISORDERS: ICD-10-CM

## 2022-05-24 DIAGNOSIS — N28.89 HYPERTENSION SECONDARY TO OTHER RENAL DISORDERS: ICD-10-CM

## 2022-05-24 DIAGNOSIS — K59.00 CONSTIPATION, UNSPECIFIED CONSTIPATION TYPE: ICD-10-CM

## 2022-05-24 PROCEDURE — G8427 DOCREV CUR MEDS BY ELIG CLIN: HCPCS | Performed by: STUDENT IN AN ORGANIZED HEALTH CARE EDUCATION/TRAINING PROGRAM

## 2022-05-24 PROCEDURE — 2022F DILAT RTA XM EVC RTNOPTHY: CPT | Performed by: STUDENT IN AN ORGANIZED HEALTH CARE EDUCATION/TRAINING PROGRAM

## 2022-05-24 PROCEDURE — G8419 CALC BMI OUT NRM PARAM NOF/U: HCPCS | Performed by: STUDENT IN AN ORGANIZED HEALTH CARE EDUCATION/TRAINING PROGRAM

## 2022-05-24 PROCEDURE — 3046F HEMOGLOBIN A1C LEVEL >9.0%: CPT | Performed by: STUDENT IN AN ORGANIZED HEALTH CARE EDUCATION/TRAINING PROGRAM

## 2022-05-24 PROCEDURE — 3017F COLORECTAL CA SCREEN DOC REV: CPT | Performed by: STUDENT IN AN ORGANIZED HEALTH CARE EDUCATION/TRAINING PROGRAM

## 2022-05-24 PROCEDURE — 99213 OFFICE O/P EST LOW 20 MIN: CPT | Performed by: STUDENT IN AN ORGANIZED HEALTH CARE EDUCATION/TRAINING PROGRAM

## 2022-05-24 PROCEDURE — 1036F TOBACCO NON-USER: CPT | Performed by: STUDENT IN AN ORGANIZED HEALTH CARE EDUCATION/TRAINING PROGRAM

## 2022-05-24 RX ORDER — PSEUDOEPHEDRINE HCL 30 MG
100 TABLET ORAL DAILY
Qty: 60 CAPSULE | Refills: 1 | Status: SHIPPED | OUTPATIENT
Start: 2022-05-24 | End: 2022-09-23 | Stop reason: SDUPTHER

## 2022-05-24 RX ORDER — B-COMPLEX WITH VITAMIN C
TABLET ORAL
Qty: 30 TABLET | Refills: 5 | Status: SHIPPED | OUTPATIENT
Start: 2022-05-24 | End: 2022-09-23 | Stop reason: SDUPTHER

## 2022-05-24 NOTE — PROGRESS NOTES
6/5/2022    Maritza Haddad    Chief Complaint   Patient presents with   Jose Cruz New Doctor     NTP- 7 week f/u        HPI  History was obtained from patient. Theresa Diamond is a 59 y.o. female with a PMHx as listed below who presents today for follow up on chornic conditons. No acute complaints. Started on trulicity decreaed mtformin glucose levels 100-160 throughout day. 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 She follows with [facility] currently on immunosuppressive agents          1. Type 2 diabetes mellitus without complication, without long-term current use of insulin ([address]    2. Hypertension secondary to other renal disorders    3. Recurrent major depressive disorder, in partial remission ([address]    4. Constipation, unspecified constipation type    5. Mixed hyperlipidemia             REVIEW OF SYMPTOMS    Review of Systems   Constitutional: Negative for chills and fatigue. HENT: Negative for congestion and sore throat. Respiratory: Negative for shortness of breath and wheezing. Cardiovascular: Negative for chest pain and palpitations. Gastrointestinal: Negative for abdominal pain and nausea. Genitourinary: Negative for frequency and urgency. Neurological: Negative for light-headedness.        PAST MEDICAL HISTORY  Past Medical History:   Diagnosis Date    Allergic rhinitis     Cancer ([address]     melanoma right knee- removed 5/2016    Chronic kidney disease     \"to start dialysis on 7/26/2017- to do every MWF\" follow with Dr Manuel Ponce Elevated blood uric acid level     End stage renal disease ([address] 6/11/2016    Fibrocystic breast     History of migraine     \"stopped with periods stopped\"    Kidney stone     Malignant melanoma of right knee ([address]     Metabolic acidosis 9/85/8592    Osteopenia     Polycystic kidney disease     Post-menopausal     Prolonged emergence from general anesthesia     \"took me awhile to come out of anesthesia with fistula surgery\"    Reactive airway disease     Spinal headache     \"with my first \"       FAMILY HISTORY  Family History   Problem Relation Age of Onset   [place] Cancer Mother         ovarian cancer    Diabetes Mother     High Blood Pressure Father     Kidney Disease Father         PKD- transplant    Kidney Disease Sister     Diabetes Maternal Aunt     Diabetes Maternal Uncle     Kidney Disease Paternal Aunt     Kidney Disease Paternal Uncle     Diabetes Maternal Grandmother     Kidney Disease Paternal Grandfather     Asthma Son        SOCIAL HISTORY  Social History     Socioeconomic History    Marital status: Single     Spouse name: Not on file    Number of children: Not on file    Years of education: Not on file    Highest education level: Not on file   Occupational History    Not on file   Tobacco Use    Smoking status: Never Smoker    Smokeless tobacco: Never Used   Vaping Use    Vaping Use: Never used   Substance and Sexual Activity    Alcohol use: No    Drug use: No    Sexual activity: Not on file   Other Topics Concern    Not on file   Social History Narrative    Not on file     Social Determinants of Health     Financial Resource Strain: Low Risk     Difficulty of Paying Living Expenses: Not hard at all   Food Insecurity: No Food Insecurity    Worried About Running Out of Food in the Last Year: Never true    Yakov of Food in the Last Year: Never true   Transportation Needs:     Lack of Transportation (Medical): Not on file    Lack of Transportation (Non-Medical):  Not on file   Physical Activity:     Days of Exercise per Week: Not on file    Minutes of Exercise per Session: Not on file   Stress:     Feeling of Stress : Not on file   Social Connections:     Frequency of Communication with Friends and Family: Not on file    Frequency of Social Gatherings with Friends and Family: Not on file    Attends Jewish Services: Not on file   [place] Active Member of Clubs or Organizations: Not on file    Attends Club or Organization Meetings: Not on file    Marital Status: Not on file   Intimate Partner Violence:     Fear of Current or Ex-Partner: Not on file    Emotionally Abused: Not on file    Physically Abused: Not on file    Sexually Abused: Not on file   Housing Stability:     Unable to Pay for Housing in the Last Year: Not on file    Number of [place] in the Last Year: Not on file    Unstable Housing in the Last Year: Not on file        SURGICAL HISTORY  Past Surgical History:   Procedure Laterality Date    BREAST LUMPECTOMY Right     \"had several breast bxs in past     SECTION  1576/0611( with BPS)    X2    DIALYSIS FISTULA CREATION  2016    left    KIDNEY TRANSPLANT      LIVER TRANSPLANT      LYMPH NODE BIOPSY      \"behind right ear\" Left side of neck    OTHER SURGICAL HISTORY Bilateral 2017    multiple hepatic cyst drainage in CT w/ Dr Samuel Jones  2016    removal of melanoma    TUBAL LIGATION  1985                 CURRENT MEDICATIONS  Current Outpatient Medications   Medication Sig Dispense Refill    Calcium Carbonate-Vitamin D (OYSTER SHELL CALCIUM/D) 500-200 MG-UNIT TABS TAKE 1 TABLET BY MOUTH EVERY DAY 30 tablet 5    sertraline (ZOLOFT) 50 MG tablet Take 1 tablet by mouth daily 90 tablet 1    docusate (COLACE, DULCOLAX) 100 MG CAPS Take 100 mg by mouth daily 60 capsule 1    clobetasol (TEMOVATE) 0.05 % ointment Apply topically 2 times daily Apply topically 2 times daily.       metFORMIN (GLUCOPHAGE) 500 MG tablet Take 2 in am and 1 in pm with meals 90 tablet 5    Dulaglutide (TRULICITY) 4.35 FE/7.1QJ SOPN Inject 0.75 mg into the skin once a week 4 pen 5    montelukast (SINGULAIR) 10 MG tablet TAKE 1 TABLET BY MOUTH EVERY DAY 30 tablet 5    blood glucose monitor strips 1 strip by Other route 2 times daily 200 strip 1    Lancets MISC 1 each by Does not apply route 2 times daily 200 each 1    Alcohol Swabs (ALCOHOL PREP) PADS 1 each by Does not apply route 2 times daily 200 each 1    atorvastatin (LIPITOR) 10 MG tablet Take 1 tablet by mouth daily 90 tablet 3    glucose monitoring kit (FREESTYLE) monitoring kit 1 kit by Does not apply route daily 1 kit 0    tacrolimus (PROGRAF) 1 MG capsule Take 2.5 mg by mouth 2 times daily       denosumab (PROLIA) 60 MG/ML SOSY SC injection Inject 60 mg into the skin once      mycophenolate (MYFORTIC) 180 MG DR tablet Take 180 mg by mouth 2 times daily       aspirin 81 MG tablet Take 81 mg by mouth daily      acetaminophen (TYLENOL) 500 MG tablet Take 500 mg by mouth as needed for Pain      BIOTIN 5000 PO Take 2 tablets by mouth daily       cetirizine (ZYRTEC) 5 MG tablet Take 10 mg by mouth daily. No current facility-administered medications for this visit. ALLERGIES  Allergies   Allergen Reactions    Pcn [Penicillins] Anaphylaxis     \"have trouble breathing, hives and itching\"    Nsaids     Tape [Adhesive Tape]      Can use the paper tape    Ciprofloxacin Rash    Sulfa Antibiotics Nausea And Vomiting    Tramadol Nausea And Vomiting       PHYSICAL EXAM    /60 (Site: Right Upper Arm, Position: Sitting, Cuff Size: Medium Adult)   Pulse 61   Resp 16   Ht 5' 1\" (1.549 m)   Wt 143 lb 14.4 oz (65.3 kg)   LMP 01/24/2000   SpO2 (!) 89%   BMI 27.19 kg/m²     Physical Exam  Constitutional:       Appearance: Normal appearance. HENT:      Head: Normocephalic and atraumatic. Eyes:      Extraocular Movements: Extraocular movements intact. Pupils: Pupils are equal, round, and reactive to light. Cardiovascular:      Rate and Rhythm: Normal rate and regular rhythm. Pulses: Normal pulses. Heart sounds: No murmur heard. No friction rub. No gallop. Pulmonary:      Effort: Pulmonary effort is normal.      Breath sounds: Normal breath sounds. Skin:     General: Skin is warm and dry.    Neurological:      General: No focal deficit present. Mental Status: She is alert. Psychiatric:         Mood and Affect: Mood normal.         Behavior: Behavior normal.         ASSESSMENT & PLAN    1. Type 2 diabetes mellitus without complication, without long-term current use of insulin (HCC)  DM2 stable well controlled    2. Hypertension secondary to other renal disorders  bp stable on current regimen  - Calcium Carbonate-Vitamin D (OYSTER SHELL CALCIUM/D) 500-200 MG-UNIT TABS; TAKE 1 TABLET BY MOUTH EVERY DAY  Dispense: 30 tablet; Refill: 5    3. Recurrent major depressive disorder, in partial remission (HCC)  stable  - sertraline (ZOLOFT) 50 MG tablet; Take 1 tablet by mouth daily  Dispense: 90 tablet; Refill: 1    4. Constipation, unspecified constipation type  - docusate (COLACE, DULCOLAX) 100 MG CAPS; Take 100 mg by mouth daily  Dispense: 60 capsule; Refill: 1    5. Mixed hyperlipidemia  F/u labs  - Lipid, Fasting; Future      Return in about 4 months (around 9/24/2022).          Electronically signed by Glen Castillo DO on 6/5/2022

## 2022-06-05 ASSESSMENT — ENCOUNTER SYMPTOMS
SHORTNESS OF BREATH: 0
SORE THROAT: 0
ABDOMINAL PAIN: 0
NAUSEA: 0
WHEEZING: 0

## 2022-06-10 LAB
CHOLESTEROL, TOTAL: 101 MG/DL
CHOLESTEROL/HDL RATIO: NORMAL
HDLC SERPL-MCNC: 46 MG/DL (ref 35–70)
LDL CHOLESTEROL CALCULATED: 35 MG/DL (ref 0–160)
NONHDLC SERPL-MCNC: NORMAL MG/DL
TRIGL SERPL-MCNC: 107 MG/DL
VLDLC SERPL CALC-MCNC: 20 MG/DL

## 2022-09-03 DIAGNOSIS — J30.2 SEASONAL ALLERGIC RHINITIS, UNSPECIFIED TRIGGER: ICD-10-CM

## 2022-09-03 DIAGNOSIS — F33.41 RECURRENT MAJOR DEPRESSIVE DISORDER, IN PARTIAL REMISSION (HCC): ICD-10-CM

## 2022-09-06 RX ORDER — MONTELUKAST SODIUM 10 MG/1
TABLET ORAL
Qty: 30 TABLET | Refills: 5 | Status: SHIPPED | OUTPATIENT
Start: 2022-09-06

## 2022-09-23 ENCOUNTER — OFFICE VISIT (OUTPATIENT)
Dept: FAMILY MEDICINE CLINIC | Age: 62
End: 2022-09-23
Payer: MEDICAID

## 2022-09-23 VITALS
DIASTOLIC BLOOD PRESSURE: 66 MMHG | BODY MASS INDEX: 26.62 KG/M2 | SYSTOLIC BLOOD PRESSURE: 130 MMHG | HEART RATE: 60 BPM | OXYGEN SATURATION: 98 % | HEIGHT: 61 IN | WEIGHT: 141 LBS

## 2022-09-23 DIAGNOSIS — G56.22 ULNAR NERVE ENTRAPMENT AT ELBOW, LEFT: Primary | ICD-10-CM

## 2022-09-23 DIAGNOSIS — I15.1 HYPERTENSION SECONDARY TO OTHER RENAL DISORDERS: ICD-10-CM

## 2022-09-23 DIAGNOSIS — K59.00 CONSTIPATION, UNSPECIFIED CONSTIPATION TYPE: ICD-10-CM

## 2022-09-23 DIAGNOSIS — N28.89 HYPERTENSION SECONDARY TO OTHER RENAL DISORDERS: ICD-10-CM

## 2022-09-23 DIAGNOSIS — E11.9 TYPE 2 DIABETES MELLITUS WITHOUT COMPLICATION, WITHOUT LONG-TERM CURRENT USE OF INSULIN (HCC): ICD-10-CM

## 2022-09-23 PROCEDURE — 3046F HEMOGLOBIN A1C LEVEL >9.0%: CPT | Performed by: STUDENT IN AN ORGANIZED HEALTH CARE EDUCATION/TRAINING PROGRAM

## 2022-09-23 PROCEDURE — G8419 CALC BMI OUT NRM PARAM NOF/U: HCPCS | Performed by: STUDENT IN AN ORGANIZED HEALTH CARE EDUCATION/TRAINING PROGRAM

## 2022-09-23 PROCEDURE — 1036F TOBACCO NON-USER: CPT | Performed by: STUDENT IN AN ORGANIZED HEALTH CARE EDUCATION/TRAINING PROGRAM

## 2022-09-23 PROCEDURE — 3017F COLORECTAL CA SCREEN DOC REV: CPT | Performed by: STUDENT IN AN ORGANIZED HEALTH CARE EDUCATION/TRAINING PROGRAM

## 2022-09-23 PROCEDURE — 2022F DILAT RTA XM EVC RTNOPTHY: CPT | Performed by: STUDENT IN AN ORGANIZED HEALTH CARE EDUCATION/TRAINING PROGRAM

## 2022-09-23 PROCEDURE — G8427 DOCREV CUR MEDS BY ELIG CLIN: HCPCS | Performed by: STUDENT IN AN ORGANIZED HEALTH CARE EDUCATION/TRAINING PROGRAM

## 2022-09-23 PROCEDURE — 99214 OFFICE O/P EST MOD 30 MIN: CPT | Performed by: STUDENT IN AN ORGANIZED HEALTH CARE EDUCATION/TRAINING PROGRAM

## 2022-09-23 RX ORDER — PSEUDOEPHEDRINE HCL 30 MG
100 TABLET ORAL DAILY
Qty: 60 CAPSULE | Refills: 1 | Status: SHIPPED | OUTPATIENT
Start: 2022-09-23

## 2022-09-23 RX ORDER — B-COMPLEX WITH VITAMIN C
TABLET ORAL
Qty: 30 TABLET | Refills: 5 | Status: SHIPPED | OUTPATIENT
Start: 2022-09-23

## 2022-09-23 RX ORDER — DULAGLUTIDE 0.75 MG/.5ML
0.75 INJECTION, SOLUTION SUBCUTANEOUS WEEKLY
Qty: 4 ADJUSTABLE DOSE PRE-FILLED PEN SYRINGE | Refills: 5 | Status: SHIPPED | OUTPATIENT
Start: 2022-09-23

## 2022-09-23 ASSESSMENT — ENCOUNTER SYMPTOMS
SHORTNESS OF BREATH: 0
WHEEZING: 0
NAUSEA: 0
SORE THROAT: 0
ABDOMINAL PAIN: 0

## 2022-09-23 NOTE — PROGRESS NOTES
9/23/2022    Renard Kelly    Chief Complaint   Patient presents with    Follow-up     4 month follow up    Elbow Pain     Aching left elbow, patient reports hitting it on something in the spring, pain present since       HPI  History was obtained from patient. Vanna Figueroa is a 57 y.o. female with a PMHx as listed below who presents today for     Left elbow sore, ongoing for months after bumping on a wall. Since then has pain, issues lifting    Glucose good, and patient has lost weight  110-120 glucose numbers    1. Ulnar nerve entrapment at elbow, left    2. Hypertension secondary to other renal disorders    3. Constipation, unspecified constipation type    4. Type 2 diabetes mellitus without complication, without long-term current use of insulin (HCC)             REVIEW OF SYMPTOMS    Review of Systems   Constitutional:  Negative for chills and fatigue. HENT:  Negative for congestion and sore throat. Respiratory:  Negative for shortness of breath and wheezing. Cardiovascular:  Negative for chest pain and palpitations. Gastrointestinal:  Negative for abdominal pain and nausea. Genitourinary:  Negative for frequency and urgency. Musculoskeletal:         Left elbow pain   Neurological:  Negative for light-headedness.      PAST MEDICAL HISTORY  Past Medical History:   Diagnosis Date    Allergic rhinitis     Cancer ([facility]     melanoma right knee- removed 5/2016    Chronic kidney disease     \"to start dialysis on 7/26/2017- to do every MWF\" follow with Dr Janice Silverman    Elevated blood uric acid level     End stage renal disease ([facility] 6/11/2016    Fibrocystic breast     History of migraine     \"stopped with periods stopped\"    Kidney stone     Malignant melanoma of right knee (HCC)     Metabolic acidosis 8/08/1233    Osteopenia     Polycystic kidney disease     Post-menopausal     Prolonged emergence from general anesthesia     \"took me awhile to come out of anesthesia with fistula surgery\"    Reactive airway disease     Spinal headache     \"with my first \"       FAMILY HISTORY  Family History   Problem Relation Age of Onset    Cancer Mother         ovarian cancer    Diabetes Mother     High Blood Pressure Father     Kidney Disease Father         PKD- transplant    Kidney Disease Sister     Diabetes Maternal Aunt     Diabetes Maternal Uncle     Kidney Disease Paternal Aunt     Kidney Disease Paternal Uncle     Diabetes Maternal Grandmother     Kidney Disease Paternal Grandfather     Asthma Son        SOCIAL HISTORY  Social History     Socioeconomic History    Marital status: Single     Spouse name: None    Number of children: None    Years of education: None    Highest education level: None   Tobacco Use    Smoking status: Never    Smokeless tobacco: Never   Vaping Use    Vaping Use: Never used   Substance and Sexual Activity    Alcohol use: No    Drug use: No     Social Determinants of Health     Financial Resource Strain: Low Risk     Difficulty of Paying Living Expenses: Not hard at all   Food Insecurity: No Food Insecurity    Worried About Running Out of Food in the Last Year: Never true    Ran Out of Food in the Last Year: Never true        SURGICAL HISTORY  Past Surgical History:   Procedure Laterality Date    BREAST LUMPECTOMY Right     \"had several breast bxs in past     SECTION  5040/9512( with BPS)    X2    DIALYSIS FISTULA CREATION  2016    left    KIDNEY TRANSPLANT      LIVER TRANSPLANT      LYMPH NODE BIOPSY      \"behind right ear\" Left side of neck    OTHER SURGICAL HISTORY Bilateral 2017    multiple hepatic cyst drainage in CT w/ Dr Kailey Doss  2016    removal of melanoma    TUBAL LIGATION  1985                 CURRENT MEDICATIONS  Current Outpatient Medications   Medication Sig Dispense Refill    Calcium Carbonate-Vitamin D (OYSTER SHELL CALCIUM/D) 500-200 MG-UNIT TABS TAKE 1 TABLET BY MOUTH EVERY DAY 30 tablet 5    docusate (COLACE, DULCOLAX) 100 MG CAPS Take 100 mg by mouth daily 60 capsule 1    Dulaglutide (TRULICITY) 6.46 TJ/1.5HN SOPN Inject 0.75 mg into the skin once a week 4 Adjustable Dose Pre-filled Pen Syringe 5    montelukast (SINGULAIR) 10 MG tablet TAKE 1 TABLET BY MOUTH EVERY DAY 30 tablet 5    sertraline (ZOLOFT) 50 MG tablet TAKE 1 TABLET BY MOUTH EVERY DAY 30 tablet 5    atorvastatin (LIPITOR) 10 MG tablet Take 1 tablet by mouth daily 90 tablet 3    clobetasol (TEMOVATE) 0.05 % ointment Apply topically 2 times daily Apply topically 2 times daily. metFORMIN (GLUCOPHAGE) 500 MG tablet Take 2 in am and 1 in pm with meals 90 tablet 5    blood glucose monitor strips 1 strip by Other route 2 times daily 200 strip 1    Lancets MISC 1 each by Does not apply route 2 times daily 200 each 1    Alcohol Swabs (ALCOHOL PREP) PADS 1 each by Does not apply route 2 times daily 200 each 1    glucose monitoring kit (FREESTYLE) monitoring kit 1 kit by Does not apply route daily 1 kit 0    tacrolimus (PROGRAF) 1 MG capsule Take 2.5 mg by mouth 2 times daily       denosumab (PROLIA) 60 MG/ML SOSY SC injection Inject 60 mg into the skin once      mycophenolate (MYFORTIC) 180 MG DR tablet Take 180 mg by mouth 2 times daily       aspirin 81 MG tablet Take 81 mg by mouth daily      acetaminophen (TYLENOL) 500 MG tablet Take 500 mg by mouth as needed for Pain      BIOTIN 5000 PO Take 2 tablets by mouth daily       cetirizine (ZYRTEC) 5 MG tablet Take 10 mg by mouth daily. No current facility-administered medications for this visit.        ALLERGIES  Allergies   Allergen Reactions    Pcn [Penicillins] Anaphylaxis     \"have trouble breathing, hives and itching\"    Nsaids     Tape [Adhesive Tape]      Can use the paper tape    Ciprofloxacin Rash    Sulfa Antibiotics Nausea And Vomiting    Tramadol Nausea And Vomiting       PHYSICAL EXAM    /66 (Site: Right Upper Arm, Position: Sitting, Cuff Size: Medium Adult)   Pulse 60   Ht 5' 1\" (1.549 m)   Wt 141 lb (64 kg)   LMP 01/24/2000   SpO2 98%   BMI 26.64 kg/m²     Physical Exam  Constitutional:       Appearance: Normal appearance. HENT:      Head: Normocephalic and atraumatic. Eyes:      Extraocular Movements: Extraocular movements intact. Pupils: Pupils are equal, round, and reactive to light. Cardiovascular:      Rate and Rhythm: Normal rate and regular rhythm. Pulses: Normal pulses. Heart sounds: No murmur heard. No friction rub. No gallop. Musculoskeletal:      Comments: +left tarsal tunnel  5/5 UE/LE m. strength   Skin:     General: Skin is warm and dry. Neurological:      General: No focal deficit present. Mental Status: She is alert. Psychiatric:         Mood and Affect: Mood normal.         Behavior: Behavior normal.       ASSESSMENT & PLAN    1. Hypertension secondary to other renal disorders    - Calcium Carbonate-Vitamin D (OYSTER SHELL CALCIUM/D) 500-200 MG-UNIT TABS; TAKE 1 TABLET BY MOUTH EVERY DAY  Dispense: 30 tablet; Refill: 5    2. Constipation, unspecified constipation type    - docusate (COLACE, DULCOLAX) 100 MG CAPS; Take 100 mg by mouth daily  Dispense: 60 capsule; Refill: 1    3. Type 2 diabetes mellitus without complication, without long-term current use of insulin ([facility])    - Dulaglutide (TRULICITY) 7.27 KK/2.4VS SOPN; Inject 0.75 mg into the skin once a week  Dispense: 4 Adjustable Dose Pre-filled Pen Syringe; Refill: 5    4. Ulnar nerve entrapment at elbow, left  - EMG; Future    BP stable  Constopation stable  DM2 well controlled issues with hgba1c, inaccurate monitor glucose closely at home  Plan for EMG given symptoms    Return in about 6 months (around 3/23/2023).          Electronically signed by Merry Hidalgo DO on 9/23/2022

## 2022-10-28 ENCOUNTER — TELEPHONE (OUTPATIENT)
Dept: FAMILY MEDICINE CLINIC | Age: 62
End: 2022-10-28

## 2022-10-28 NOTE — TELEPHONE ENCOUNTER
Call from insurance company for [employer]. They need proof that bone density has improved on Prolia. She has one ordered from Dr. Torito Guillen on 10/31/2022.

## 2022-10-31 ENCOUNTER — TELEPHONE (OUTPATIENT)
Dept: FAMILY MEDICINE CLINIC | Age: 62
End: 2022-10-31

## 2022-10-31 DIAGNOSIS — M81.6 LOCALIZED OSTEOPOROSIS WITHOUT PATHOLOGICAL FRACTURE: ICD-10-CM

## 2022-10-31 NOTE — TELEPHONE ENCOUNTER
Representative from [address] [place] [address] called to inform us the prolia has been approved for the patient at [facility], patient notified and voiced understanding, stated she would call the infusion center to get scheduled.

## 2022-11-01 DIAGNOSIS — Z13.820 ENCOUNTER FOR OSTEOPOROSIS SCREENING IN ASYMPTOMATIC POSTMENOPAUSAL PATIENT: ICD-10-CM

## 2022-11-01 DIAGNOSIS — Z78.0 ENCOUNTER FOR OSTEOPOROSIS SCREENING IN ASYMPTOMATIC POSTMENOPAUSAL PATIENT: ICD-10-CM

## 2022-11-01 DIAGNOSIS — Z01.419 ENCOUNTER FOR ANNUAL ROUTINE GYNECOLOGICAL EXAMINATION: ICD-10-CM

## 2022-11-01 DIAGNOSIS — Z12.31 SCREENING MAMMOGRAM FOR BREAST CANCER: ICD-10-CM

## 2022-11-07 ENCOUNTER — HOSPITAL ENCOUNTER (OUTPATIENT)
Dept: INFUSION THERAPY | Age: 62
Setting detail: INFUSION SERIES
Discharge: HOME | End: 2022-11-07
Payer: MEDICAID

## 2022-11-07 ENCOUNTER — HOSPITAL ENCOUNTER (OUTPATIENT)
Dept: INFUSION THERAPY | Age: 62
Discharge: HOME | End: 2022-11-07

## 2022-11-07 VITALS
OXYGEN SATURATION: 97 % | SYSTOLIC BLOOD PRESSURE: 110 MMHG | RESPIRATION RATE: 16 BRPM | DIASTOLIC BLOOD PRESSURE: 58 MMHG | HEART RATE: 62 BPM | TEMPERATURE: 97.3 F

## 2022-11-07 DIAGNOSIS — M81.6 LOCALIZED OSTEOPOROSIS WITHOUT CURRENT PATHOLOGICAL FRACTURE: ICD-10-CM

## 2022-11-07 DIAGNOSIS — M81.6 LOCALIZED OSTEOPOROSIS WITHOUT PATHOLOGICAL FRACTURE: Primary | ICD-10-CM

## 2022-11-07 PROCEDURE — 99211 OFF/OP EST MAY X REQ PHY/QHP: CPT

## 2022-11-07 PROCEDURE — 96372 THER/PROPH/DIAG INJ SC/IM: CPT

## 2022-11-07 PROCEDURE — 6360000002 HC RX W HCPCS

## 2022-11-07 RX ADMIN — DENOSUMAB 60 MG: 60 INJECTION SUBCUTANEOUS at 11:10

## 2022-11-07 NOTE — PLAN OF CARE
Ambulatory to unit room 1 for Prolia. Orientated to unit. Procedure and plan of care explained. Questions answered. Understanding verbalized.

## 2022-11-07 NOTE — DISCHARGE SUMMARY
Tolerated INJECTION well. Reviewed discharge instructions, understanding verbalized. Copies of AVS given to take home. Patient discharged home. Down to exit per self.     Orders Placed This Encounter   Medications    denosumab (PROLIA) SC injection 60 mg

## 2022-11-07 NOTE — DISCHARGE INSTRUCTIONS
Prolia  Increase liquid intake for the next 2-3 days, especially water. You may experience increase bone pain after injection  Take pain medication as directed   May be sore at injection site. Call your doctor should you experience jaw pain or new dental complaints  Continue home meds, diet and activity  Call your Doctor for any specific questions or problems. Prolia injection is given every 6 months    Thank you for choosing [facility] Outpatient Infusion unit. It is our pleasure to serve you.       Hours 8:00 am - 5:00 pm  Phone Number: [phone]

## 2022-11-11 ENCOUNTER — TELEPHONE (OUTPATIENT)
Dept: FAMILY MEDICINE CLINIC | Age: 62
End: 2022-11-11

## 2022-11-11 NOTE — TELEPHONE ENCOUNTER
Left VM to see if patient was able to get her Prolia. Her insurance was looking for a new Dexa before one was completed 10/31/22. I let patient know if Dr. Sophy Tran office ( ordering test ) was not able to send new Dexa for some reason that we could send to insurance if needed.

## 2022-11-21 DIAGNOSIS — E11.9 TYPE 2 DIABETES MELLITUS WITHOUT COMPLICATION, WITHOUT LONG-TERM CURRENT USE OF INSULIN (HCC): ICD-10-CM

## 2022-12-16 ENCOUNTER — PROCEDURE VISIT (OUTPATIENT)
Dept: NEUROLOGY | Age: 62
End: 2022-12-16
Payer: MEDICAID

## 2022-12-16 DIAGNOSIS — R20.0 NUMBNESS OF LEFT HAND: Primary | ICD-10-CM

## 2022-12-16 DIAGNOSIS — M25.522 LEFT ELBOW PAIN: ICD-10-CM

## 2022-12-16 PROCEDURE — 95909 NRV CNDJ TST 5-6 STUDIES: CPT | Performed by: STUDENT IN AN ORGANIZED HEALTH CARE EDUCATION/TRAINING PROGRAM

## 2022-12-16 PROCEDURE — 95886 MUSC TEST DONE W/N TEST COMP: CPT | Performed by: STUDENT IN AN ORGANIZED HEALTH CARE EDUCATION/TRAINING PROGRAM

## 2022-12-16 NOTE — PROGRESS NOTES
EMG Upper Limbs:   EMG/NCS UPPER EXTREMITIES:    Reason for referral/Clinical data:  Jamie Rm presents today for left upper extremity EMG to evaluate left arm pain. She states that she hit her left arm approximately 10 months ago and had pain around the elbow. She noticed following that she had numbness in the hand and a weak  strength. She does state that this has improved recently. She does have a fistula in the left upper extremity. Refer to the Electrodiagnostic Data Sheet for normative values, specific techniques utilized for conductions, the numeric values obtained on nerve conductions, and specific muscles sampled for needle examination. Informed consent was given by the patient after discussion of the following - Expected potential benefits of procedure include the following: identifying diagnoses, excluding diagnoses, and helping the treating practitioners to prescribe treatment, testing, and follow-up. Possible adverse events of electrodiagnostic testing include local discomfort, mild bleeding or bruising (common) and rare/uncommon events such as infection, nausea, fainting, or other idiosyncratic adverse events. Motor studies:    -- Left median motor response demonstrates amplitude which is normal, distal latency which is normal, and conduction velocity which is normal.    -- Left ulnar motor response demonstrates amplitude which is normal, distal latency which is normal, and conduction velocity which is normal in both forearm and elbow segments. Reid-Chaitanya anastamosis findings are not identified. Sensory studies:    -- Left median sensory nerve conduction response demonstrates normal amplitude, and distal latency which is normal.   -- Left ulnar sensory nerve conduction response demonstrates normal amplitude, and distal latency which is normal.   -- Left radial sensory study is normal for amplitude and latency.       NEEDLE ELECTRODE EXAMINATION  Needle examination performed throughout multiple, selected muscles of the bilateral upper limbs  (as detailed on the data sheet) demonstrates findings which are normal     Diagnosis Orders   1. Numbness of left hand        2. Left elbow pain             Impression: This is a normal electrodiagnostic study of the left upper extremity in that there is no evidence of a mononeuropathy, generalized neuropathy, cervical radiculopathy, brachial plexopathy, or myopathy. Thank you for allowing us to participate in the care of your patient. Please do not hesitate to contact us with questions.     Cruz Merida DO  12/16/2022 11:33 AM

## 2023-03-22 DIAGNOSIS — J30.2 SEASONAL ALLERGIC RHINITIS, UNSPECIFIED TRIGGER: ICD-10-CM

## 2023-03-22 DIAGNOSIS — E11.9 TYPE 2 DIABETES MELLITUS WITHOUT COMPLICATION, WITHOUT LONG-TERM CURRENT USE OF INSULIN (HCC): ICD-10-CM

## 2023-03-22 RX ORDER — MONTELUKAST SODIUM 10 MG/1
TABLET ORAL
Qty: 30 TABLET | Refills: 5 | Status: SHIPPED | OUTPATIENT
Start: 2023-03-22 | End: 2023-03-23 | Stop reason: SDUPTHER

## 2023-03-22 RX ORDER — DULAGLUTIDE 0.75 MG/.5ML
0.75 INJECTION, SOLUTION SUBCUTANEOUS WEEKLY
Qty: 4 ADJUSTABLE DOSE PRE-FILLED PEN SYRINGE | Refills: 5 | Status: SHIPPED | OUTPATIENT
Start: 2023-03-22 | End: 2023-03-23 | Stop reason: SDUPTHER

## 2023-03-28 ENCOUNTER — TELEPHONE (OUTPATIENT)
Dept: GASTROENTEROLOGY | Age: 63
End: 2023-03-28

## 2023-03-28 NOTE — TELEPHONE ENCOUNTER
Called pt. In regards to a referral for a colon screening. Unable to find report from 2018 at this time. I set up appt for 4/14/23 @10;30 w/ hua but will keep looking for op report and may naa procedure open access depending.

## 2023-03-29 RX ORDER — POLYETHYLENE GLYCOL 3350, SODIUM CHLORIDE, POTASSIUM CHLORIDE, SODIUM BICARBONATE, AND SODIUM SULFATE 240; 5.84; 2.98; 6.72; 22.72 G/4L; G/4L; G/4L; G/4L; G/4L
4000 POWDER, FOR SOLUTION ORAL ONCE
Qty: 4000 ML | Refills: 0 | Status: SHIPPED | OUTPATIENT
Start: 2023-03-29 | End: 2023-03-29

## 2023-04-20 LAB
AVERAGE GLUCOSE: NORMAL
HBA1C MFR BLD: 7.1 %

## 2023-04-21 DIAGNOSIS — M81.6 LOCALIZED OSTEOPOROSIS WITHOUT PATHOLOGICAL FRACTURE: ICD-10-CM

## 2023-04-24 RX ORDER — DENOSUMAB 60 MG/ML
60 INJECTION SUBCUTANEOUS
Qty: 180 ML | Refills: 0 | Status: SHIPPED | OUTPATIENT
Start: 2023-04-24 | End: 2023-04-25 | Stop reason: CLARIF

## 2023-04-25 ENCOUNTER — TELEPHONE (OUTPATIENT)
Dept: FAMILY MEDICINE CLINIC | Age: 63
End: 2023-04-25

## 2023-04-25 RX ORDER — SODIUM CHLORIDE 9 MG/ML
INJECTION, SOLUTION INTRAVENOUS CONTINUOUS
Status: CANCELLED | OUTPATIENT
Start: 2023-05-12

## 2023-04-25 RX ORDER — ONDANSETRON 2 MG/ML
8 INJECTION INTRAMUSCULAR; INTRAVENOUS
Status: CANCELLED | OUTPATIENT
Start: 2023-05-12

## 2023-04-25 RX ORDER — ACETAMINOPHEN 325 MG/1
650 TABLET ORAL
Status: CANCELLED | OUTPATIENT
Start: 2023-05-12

## 2023-04-25 RX ORDER — EPINEPHRINE 1 MG/ML
0.3 INJECTION, SOLUTION, CONCENTRATE INTRAVENOUS PRN
Status: CANCELLED | OUTPATIENT
Start: 2023-05-12

## 2023-04-25 RX ORDER — DIPHENHYDRAMINE HYDROCHLORIDE 50 MG/ML
50 INJECTION INTRAMUSCULAR; INTRAVENOUS
Status: CANCELLED | OUTPATIENT
Start: 2023-05-12

## 2023-04-25 RX ORDER — FAMOTIDINE 10 MG/ML
20 INJECTION, SOLUTION INTRAVENOUS
Status: CANCELLED | OUTPATIENT
Start: 2023-05-12

## 2023-04-25 RX ORDER — ALBUTEROL SULFATE 90 UG/1
4 AEROSOL, METERED RESPIRATORY (INHALATION) PRN
Status: CANCELLED | OUTPATIENT
Start: 2023-05-12

## 2023-04-25 NOTE — TELEPHONE ENCOUNTER
To Caren Harmon: denosumab (PROLIA) 60 MG/ML SOSY SC injection     Prolia was sent to the [address] and they don't fill those prescriptions; please send to either [facility] or the [facility].

## 2023-05-01 ENCOUNTER — ANESTHESIA EVENT (OUTPATIENT)
Dept: OPERATING ROOM | Age: 63
End: 2023-05-01
Payer: MEDICAID

## 2023-05-01 NOTE — PROGRESS NOTES
Patient notified of procedure time at [facility] 5/2/2023 1130 with arrival at 1030, understanding verbalized

## 2023-05-01 NOTE — ANESTHESIA PRE PROCEDURE
Department of Anesthesiology  Preprocedure Note       Name:  Neisha Tristan   Age:  57 y.o.  :  1960                                          MRN:  [id number]         Date:  2023      Surgeon: Lise Goode):  Giles Ramos MD    Procedure: Procedure(s):  COLONOSCOPY DIAGNOSTIC    Medications prior to admission:   Prior to Admission medications    Medication Sig Start Date End Date Taking?  Authorizing Provider   docusate (COLACE, DULCOLAX) 100 MG CAPS Take 100 mg by mouth daily 3/23/23   [facility]   metFORMIN (GLUCOPHAGE) 500 MG tablet Take 1 tablet by mouth daily 3/23/23   [facility] R Contreras, DO   sertraline (ZOLOFT) 50 MG tablet Take 1 tablet by mouth daily 3/23/23   [facility]   Dulaglutide (TRULICITY) 9.53 MH/2.3HA SOPN Inject 0.75 mg into the skin once a week 3/23/23   [facility]   montelukast (SINGULAIR) 10 MG tablet Take 1 tablet by mouth daily 3/23/23   [facility], DO   dapagliflozin (FARXIGA) 10 MG tablet Take 1 tablet by mouth every morning 3/7/23   Raina Bell MD   Calcium Carbonate-Vitamin D (OYSTER SHELL CALCIUM/D) 500-200 MG-UNIT TABS TAKE 1 TABLET BY MOUTH EVERY DAY 22   [facility]   atorvastatin (LIPITOR) 10 MG tablet Take 1 tablet by mouth daily 22   Raina Bell MD   clobetasol (TEMOVATE) 0.05 % ointment Apply topically as needed    Danika Shepherd MD   blood glucose monitor strips 1 strip by Other route 2 times daily 12/1/21 3/6/23  Samson Suazo MD   Lancets MISC 1 each by Does not apply route 2 times daily 12/1/21 3/6/23  Samson Suazo MD   Alcohol Swabs (ALCOHOL PREP) PADS 1 each by Does not apply route 2 times daily 12/1/21 3/6/23  Samson Suazo MD   tacrolimus (PROGRAF) 1 MG capsule Take 2 mg by mouth 2 times daily    Nelida Rodriguez   mycophenolate (MYFORTIC) 180 MG DR tablet Take 180 mg by mouth 2 times daily     Historical Provider, MD   aspirin 81 MG tablet Take 81 mg by mouth daily

## 2023-05-02 ENCOUNTER — HOSPITAL ENCOUNTER (OUTPATIENT)
Age: 63
Setting detail: OUTPATIENT SURGERY
Discharge: HOME | End: 2023-05-02
Attending: SPECIALIST | Admitting: SPECIALIST
Payer: MEDICAID

## 2023-05-02 ENCOUNTER — ANESTHESIA (OUTPATIENT)
Dept: OPERATING ROOM | Age: 63
End: 2023-05-02
Payer: MEDICAID

## 2023-05-02 VITALS
OXYGEN SATURATION: 97 % | WEIGHT: 137 LBS | TEMPERATURE: 97.5 F | RESPIRATION RATE: 16 BRPM | HEIGHT: 61 IN | DIASTOLIC BLOOD PRESSURE: 61 MMHG | SYSTOLIC BLOOD PRESSURE: 134 MMHG | HEART RATE: 53 BPM | BODY MASS INDEX: 25.86 KG/M2

## 2023-05-02 PROBLEM — Z12.11 COLON CANCER SCREENING: Status: ACTIVE | Noted: 2023-05-02

## 2023-05-02 LAB — GLUCOSE BLD-MCNC: 112 MG/DL (ref 70–99)

## 2023-05-02 PROCEDURE — 82962 GLUCOSE BLOOD TEST: CPT

## 2023-05-02 PROCEDURE — 2709999900 HC NON-CHARGEABLE SUPPLY: Performed by: SPECIALIST

## 2023-05-02 PROCEDURE — 6360000002 HC RX W HCPCS: Performed by: NURSE ANESTHETIST, CERTIFIED REGISTERED

## 2023-05-02 PROCEDURE — 7100000010 HC PHASE II RECOVERY - FIRST 15 MIN: Performed by: SPECIALIST

## 2023-05-02 PROCEDURE — 3700000001 HC ADD 15 MINUTES (ANESTHESIA): Performed by: SPECIALIST

## 2023-05-02 PROCEDURE — 2580000003 HC RX 258: Performed by: ANESTHESIOLOGY

## 2023-05-02 PROCEDURE — 3609027000 HC COLONOSCOPY: Performed by: SPECIALIST

## 2023-05-02 PROCEDURE — 45378 DIAGNOSTIC COLONOSCOPY: CPT | Performed by: SPECIALIST

## 2023-05-02 PROCEDURE — 3700000000 HC ANESTHESIA ATTENDED CARE: Performed by: SPECIALIST

## 2023-05-02 PROCEDURE — 7100000011 HC PHASE II RECOVERY - ADDTL 15 MIN: Performed by: SPECIALIST

## 2023-05-02 RX ORDER — LIDOCAINE HYDROCHLORIDE 20 MG/ML
INJECTION, SOLUTION INTRAVENOUS PRN
Status: DISCONTINUED | OUTPATIENT
Start: 2023-05-02 | End: 2023-05-02 | Stop reason: SDUPTHER

## 2023-05-02 RX ORDER — PROPOFOL 10 MG/ML
INJECTION, EMULSION INTRAVENOUS PRN
Status: DISCONTINUED | OUTPATIENT
Start: 2023-05-02 | End: 2023-05-02 | Stop reason: SDUPTHER

## 2023-05-02 RX ORDER — SODIUM CHLORIDE, SODIUM LACTATE, POTASSIUM CHLORIDE, CALCIUM CHLORIDE 600; 310; 30; 20 MG/100ML; MG/100ML; MG/100ML; MG/100ML
INJECTION, SOLUTION INTRAVENOUS CONTINUOUS
Status: DISCONTINUED | OUTPATIENT
Start: 2023-05-02 | End: 2023-05-02 | Stop reason: HOSPADM

## 2023-05-02 RX ADMIN — PROPOFOL 220 MG: 10 INJECTION, EMULSION INTRAVENOUS at 11:33

## 2023-05-02 RX ADMIN — SODIUM CHLORIDE, POTASSIUM CHLORIDE, SODIUM LACTATE AND CALCIUM CHLORIDE: 600; 310; 30; 20 INJECTION, SOLUTION INTRAVENOUS at 11:15

## 2023-05-02 RX ADMIN — LIDOCAINE HYDROCHLORIDE 100 MG: 20 INJECTION, SOLUTION INTRAVENOUS at 11:33

## 2023-05-02 ASSESSMENT — PAIN - FUNCTIONAL ASSESSMENT: PAIN_FUNCTIONAL_ASSESSMENT: 0-10

## 2023-05-02 ASSESSMENT — PAIN SCALES - GENERAL: PAINLEVEL_OUTOF10: 0

## 2023-05-02 NOTE — ANESTHESIA POSTPROCEDURE EVALUATION
Department of Anesthesiology  Postprocedure Note    Patient: Meryl Aldana  MRN: [id number]  YOB: 1960  Date of evaluation: 5/2/2023      Procedure Summary     Date: 05/02/23 Room / Location: [place] [address] 05 / [facility]    Anesthesia Start: 1127 Anesthesia Stop: 1200    Procedure: COLONOSCOPY DIAGNOSTIC Diagnosis:       Hx of colonic polyp      (hx of polyps)    Surgeons: Nito Dean MD Responsible Provider: THERESA Teran CRNA    Anesthesia Type: MAC ASA Status: 3          Anesthesia Type: No value filed.     Preet Phase I: Preet Score: 10    Preet Phase II:   10    Anesthesia Post Evaluation    Patient location during evaluation: bedside  Patient participation: complete - patient participated  Level of consciousness: awake and alert  Pain score: 0  Airway patency: patent  Nausea & Vomiting: no nausea and no vomiting  Complications: no  Cardiovascular status: hemodynamically stable  Respiratory status: acceptable, room air, spontaneous ventilation and nonlabored ventilation  Hydration status: euvolemic

## 2023-05-02 NOTE — DISCHARGE INSTRUCTIONS
COLONOSCOPY    DR. Eusebio Henderson    OFFICE NUMBER [phone]    FOLLOW UP APPOINTMENT AS NEEDED. REPEAT PROCEDURE IN  10 YEARS OR AS NEEDED. TEST ORDERED: NONE     What to expect at home: Your Recovery   Your doctor will tell you when you can eat and do your other usual activities Your doctor will talk to you about when you will need your next colonoscopy. Your doctor can help you decide how often you need to be checked. This will depend on the results of your test and your risk for colorectal cancer. After the test, you may be bloated or have gas pains. You may need to pass gas. If a biopsy was done or a polyp was removed, you may have streaks of blood in your stool (feces) for a few days. This care sheet gives you a general idea about how long it will take for you to recover. But each person recovers at a different pace. Follow the steps below to get better as quickly as possible. How can you care for yourself at home? Activity  Rest when you feel tired. Diet  Follow your doctor's directions for eating. Unless your doctor has told you not to, drink plenty of fluids. This helps to replace the fluids that were lost during the colon prep. DO NOT DRINK ALCOHOL. Medicines  Your doctor will tell you if and when you can restart your medicines. He or she will also give you instructions about taking any new medicines. If you take blood thinners, such as warfarin (Coumadin), clopidogrel (Plavix), or aspirin, be sure to talk to your doctor. He or she will tell you if and when to start taking those medicines again. Make sure that you understand exactly what your doctor wants you to do. If polyps were removed or a biopsy was done during the test, your doctor may tell you not to take aspirin or other anti-inflammatory medicines for a few days. These include ibuprofen (Advil, Motrin) and naproxen (Aleve). Other instructions:Anesthesia  For your safety, do not drive or operate machinery for 24 hours.   Do not sign

## 2023-05-02 NOTE — PROGRESS NOTES
1205- Pt returned to room from [address] [place] [address] via stretcher. s. Report received from [facility]. Pt A&O. VS stable. Abdomen soft, non tender and non distended. Denies complaints of pain or needs. Call light placed within reach, side rails up x's 2.   1209- snack and beverage offered. Pt VS remain stable. 1215-tolerating snack and beverage without nausea or vomiting noted. Phone call to sister Michael Gallegos letting her know pt was finished. 1220-VS stable. Pt still denies complaints of pain or needs. Wife still at bedside. Abdomen soft, non tender and non distended. 1235-Discharge instructions and education provided to pt and pt voices understanding. 1240- iv discontinued. Pt requests to sit on side of bed before getting dressed. Pt continues to drink beverage. 1255-pt now getting dressed and using restroom. Denies any c/o pain or discomfort. 1312-Pt escorted to main entrance x 1 assist for discharge.

## 2023-05-10 ENCOUNTER — HOSPITAL ENCOUNTER (OUTPATIENT)
Dept: INFUSION THERAPY | Age: 63
Setting detail: INFUSION SERIES
Discharge: HOME | End: 2023-05-10
Payer: MEDICAID

## 2023-05-10 VITALS
SYSTOLIC BLOOD PRESSURE: 118 MMHG | HEART RATE: 52 BPM | RESPIRATION RATE: 16 BRPM | TEMPERATURE: 98.3 F | DIASTOLIC BLOOD PRESSURE: 56 MMHG

## 2023-05-10 DIAGNOSIS — M81.6 LOCALIZED OSTEOPOROSIS WITHOUT PATHOLOGICAL FRACTURE: Primary | ICD-10-CM

## 2023-05-10 PROCEDURE — 96372 THER/PROPH/DIAG INJ SC/IM: CPT

## 2023-05-10 PROCEDURE — 6360000002 HC RX W HCPCS

## 2023-05-10 RX ORDER — DIPHENHYDRAMINE HYDROCHLORIDE 50 MG/ML
50 INJECTION INTRAMUSCULAR; INTRAVENOUS
Status: CANCELLED | OUTPATIENT
Start: 2023-11-08

## 2023-05-10 RX ORDER — ONDANSETRON 2 MG/ML
8 INJECTION INTRAMUSCULAR; INTRAVENOUS
Status: CANCELLED | OUTPATIENT
Start: 2023-11-08

## 2023-05-10 RX ORDER — ALBUTEROL SULFATE 90 UG/1
4 AEROSOL, METERED RESPIRATORY (INHALATION) PRN
Status: CANCELLED | OUTPATIENT
Start: 2023-11-08

## 2023-05-10 RX ORDER — FAMOTIDINE 10 MG/ML
20 INJECTION, SOLUTION INTRAVENOUS
Status: CANCELLED | OUTPATIENT
Start: 2023-11-08

## 2023-05-10 RX ORDER — EPINEPHRINE 1 MG/ML
0.3 INJECTION, SOLUTION, CONCENTRATE INTRAVENOUS PRN
Status: CANCELLED | OUTPATIENT
Start: 2023-11-08

## 2023-05-10 RX ORDER — ACETAMINOPHEN 325 MG/1
650 TABLET ORAL
Status: CANCELLED | OUTPATIENT
Start: 2023-11-08

## 2023-05-10 RX ORDER — SODIUM CHLORIDE 9 MG/ML
INJECTION, SOLUTION INTRAVENOUS CONTINUOUS
Status: CANCELLED | OUTPATIENT
Start: 2023-11-08

## 2023-05-10 RX ADMIN — DENOSUMAB 60 MG: 60 INJECTION SUBCUTANEOUS at 13:30

## 2023-05-10 NOTE — PROGRESS NOTES
DSD applied. Pt tolerated well. Discharged instructions given to pt, pt voiced understanding. Pt discharged via AMBULATORY by SELF TO EXIT.

## 2023-05-24 DIAGNOSIS — I15.1 HYPERTENSION SECONDARY TO OTHER RENAL DISORDERS: ICD-10-CM

## 2023-05-24 DIAGNOSIS — N28.89 HYPERTENSION SECONDARY TO OTHER RENAL DISORDERS: ICD-10-CM

## 2023-06-01 PROBLEM — Z12.11 COLON CANCER SCREENING: Status: RESOLVED | Noted: 2023-05-02 | Resolved: 2023-06-01

## 2023-06-06 LAB
CHOLESTEROL, TOTAL: 134 MG/DL
CHOLESTEROL/HDL RATIO: NORMAL
HDLC SERPL-MCNC: 52 MG/DL (ref 35–70)
LDL CHOLESTEROL CALCULATED: 59 MG/DL (ref 0–160)
NONHDLC SERPL-MCNC: 59 MG/DL
TRIGL SERPL-MCNC: 133 MG/DL
VLDLC SERPL CALC-MCNC: 23 MG/DL

## 2023-08-11 RX ORDER — UBIQUINOL 100 MG
CAPSULE ORAL
Qty: 200 EACH | Refills: 1 | Status: SHIPPED | OUTPATIENT
Start: 2023-08-11

## 2023-09-01 ENCOUNTER — TELEPHONE (OUTPATIENT)
Dept: FAMILY MEDICINE CLINIC | Age: 63
End: 2023-09-01

## 2023-09-19 ENCOUNTER — OFFICE VISIT (OUTPATIENT)
Dept: FAMILY MEDICINE CLINIC | Age: 63
End: 2023-09-19
Payer: MEDICAID

## 2023-09-19 VITALS
SYSTOLIC BLOOD PRESSURE: 112 MMHG | OXYGEN SATURATION: 97 % | HEART RATE: 57 BPM | WEIGHT: 132.5 LBS | BODY MASS INDEX: 25.02 KG/M2 | DIASTOLIC BLOOD PRESSURE: 54 MMHG | HEIGHT: 61 IN

## 2023-09-19 DIAGNOSIS — J30.2 SEASONAL ALLERGIC RHINITIS, UNSPECIFIED TRIGGER: ICD-10-CM

## 2023-09-19 DIAGNOSIS — K59.00 CONSTIPATION, UNSPECIFIED CONSTIPATION TYPE: ICD-10-CM

## 2023-09-19 DIAGNOSIS — F33.41 RECURRENT MAJOR DEPRESSIVE DISORDER, IN PARTIAL REMISSION (HCC): ICD-10-CM

## 2023-09-19 DIAGNOSIS — Z23 ENCOUNTER FOR IMMUNIZATION: ICD-10-CM

## 2023-09-19 DIAGNOSIS — E11.9 TYPE 2 DIABETES MELLITUS WITHOUT COMPLICATION, WITHOUT LONG-TERM CURRENT USE OF INSULIN (HCC): Primary | ICD-10-CM

## 2023-09-19 LAB — HBA1C MFR BLD: 7 %

## 2023-09-19 PROCEDURE — 1123F ACP DISCUSS/DSCN MKR DOCD: CPT | Performed by: STUDENT IN AN ORGANIZED HEALTH CARE EDUCATION/TRAINING PROGRAM

## 2023-09-19 PROCEDURE — 3051F HG A1C>EQUAL 7.0%<8.0%: CPT | Performed by: STUDENT IN AN ORGANIZED HEALTH CARE EDUCATION/TRAINING PROGRAM

## 2023-09-19 PROCEDURE — 99214 OFFICE O/P EST MOD 30 MIN: CPT | Performed by: STUDENT IN AN ORGANIZED HEALTH CARE EDUCATION/TRAINING PROGRAM

## 2023-09-19 PROCEDURE — 90471 IMMUNIZATION ADMIN: CPT | Performed by: STUDENT IN AN ORGANIZED HEALTH CARE EDUCATION/TRAINING PROGRAM

## 2023-09-19 PROCEDURE — 90694 VACC AIIV4 NO PRSRV 0.5ML IM: CPT | Performed by: STUDENT IN AN ORGANIZED HEALTH CARE EDUCATION/TRAINING PROGRAM

## 2023-09-19 PROCEDURE — 83036 HEMOGLOBIN GLYCOSYLATED A1C: CPT | Performed by: STUDENT IN AN ORGANIZED HEALTH CARE EDUCATION/TRAINING PROGRAM

## 2023-09-19 RX ORDER — PSEUDOEPHEDRINE HCL 30 MG
100 TABLET ORAL DAILY
Qty: 60 CAPSULE | Refills: 1 | Status: SHIPPED | OUTPATIENT
Start: 2023-09-19

## 2023-09-19 RX ORDER — DULAGLUTIDE 0.75 MG/.5ML
0.75 INJECTION, SOLUTION SUBCUTANEOUS WEEKLY
Qty: 4 ADJUSTABLE DOSE PRE-FILLED PEN SYRINGE | Refills: 5 | Status: SHIPPED | OUTPATIENT
Start: 2023-09-19

## 2023-09-19 RX ORDER — MONTELUKAST SODIUM 10 MG/1
10 TABLET ORAL DAILY
Qty: 30 TABLET | Refills: 5 | Status: SHIPPED | OUTPATIENT
Start: 2023-09-19

## 2023-09-19 ASSESSMENT — PATIENT HEALTH QUESTIONNAIRE - PHQ9
10. IF YOU CHECKED OFF ANY PROBLEMS, HOW DIFFICULT HAVE THESE PROBLEMS MADE IT FOR YOU TO DO YOUR WORK, TAKE CARE OF THINGS AT HOME, OR GET ALONG WITH OTHER PEOPLE: 0
6. FEELING BAD ABOUT YOURSELF - OR THAT YOU ARE A FAILURE OR HAVE LET YOURSELF OR YOUR FAMILY DOWN: 0
SUM OF ALL RESPONSES TO PHQ9 QUESTIONS 1 & 2: 0
7. TROUBLE CONCENTRATING ON THINGS, SUCH AS READING THE NEWSPAPER OR WATCHING TELEVISION: 0
2. FEELING DOWN, DEPRESSED OR HOPELESS: 0
SUM OF ALL RESPONSES TO PHQ QUESTIONS 1-9: 0
8. MOVING OR SPEAKING SO SLOWLY THAT OTHER PEOPLE COULD HAVE NOTICED. OR THE OPPOSITE, BEING SO FIGETY OR RESTLESS THAT YOU HAVE BEEN MOVING AROUND A LOT MORE THAN USUAL: 0
1. LITTLE INTEREST OR PLEASURE IN DOING THINGS: 0
SUM OF ALL RESPONSES TO PHQ QUESTIONS 1-9: 0
SUM OF ALL RESPONSES TO PHQ QUESTIONS 1-9: 0
5. POOR APPETITE OR OVEREATING: 0
9. THOUGHTS THAT YOU WOULD BE BETTER OFF DEAD, OR OF HURTING YOURSELF: 0
SUM OF ALL RESPONSES TO PHQ QUESTIONS 1-9: 0
3. TROUBLE FALLING OR STAYING ASLEEP: 0
4. FEELING TIRED OR HAVING LITTLE ENERGY: 0

## 2023-09-19 ASSESSMENT — ENCOUNTER SYMPTOMS
NAUSEA: 0
SHORTNESS OF BREATH: 0
WHEEZING: 0
ABDOMINAL PAIN: 0
SORE THROAT: 0

## 2023-09-19 NOTE — PATIENT INSTRUCTIONS
Audrey Goodell please consider the following vaccines:    Newest update Covid, Shingrix vaccine (2 doses 2nd dose 2-6 months after) and the new RSV vaccine

## 2023-09-19 NOTE — PROGRESS NOTES
2023    Heriberto Lick    Chief Complaint   Patient presents with    6 Month Follow-Up     Type 2 dm        HPI  History was obtained from stephanie. Felipe Davila is a 70 y.o. female with a PMHx as listed below who presents today for 6 month follow up. No acute complaints      Continues to follow with [facility] transplant    1. Type 2 diabetes mellitus without complication, without long-term current use of insulin ([address])    2. Constipation, unspecified constipation type    3. Encounter for immunization    4. Seasonal allergic rhinitis, unspecified trigger    5. Recurrent major depressive disorder, in partial remission ([address])             REVIEW OF SYMPTOMS    Review of Systems   Constitutional:  Negative for chills and fatigue. HENT:  Negative for congestion and sore throat. Respiratory:  Negative for shortness of breath and wheezing. Cardiovascular:  Negative for chest pain and palpitations. Gastrointestinal:  Negative for abdominal pain and nausea. Genitourinary:  Negative for frequency and urgency. Neurological:  Negative for light-headedness.        PAST MEDICAL HISTORY  Past Medical History:   Diagnosis Date    Allergic rhinitis     Cancer ([address])     melanoma right knee- removed 2016    Chronic kidney disease     \"to start dialysis on 2017- to do every MWF\" follow with Dr Ghada Duron    Elevated blood uric acid level     End stage renal disease ([address]) 2016    Fibrocystic breast     History of migraine     \"stopped with periods stopped\"    Kidney stone     Malignant melanoma of right knee (HCC)     Metabolic acidosis     Osteopenia     Polycystic kidney disease     Post-menopausal     Prolonged emergence from general anesthesia     \"took me awhile to come out of anesthesia with fistula surgery\"    Reactive airway disease     Spinal headache     \"with my first \"       FAMILY HISTORY  Family History   Problem Relation Age of Onset    Cancer Mother         ovarian cancer    Diabetes

## 2023-10-12 RX ORDER — SODIUM CHLORIDE 9 MG/ML
INJECTION, SOLUTION INTRAVENOUS CONTINUOUS
Status: CANCELLED | OUTPATIENT
Start: 2023-11-13

## 2023-10-12 RX ORDER — DIPHENHYDRAMINE HYDROCHLORIDE 50 MG/ML
50 INJECTION INTRAMUSCULAR; INTRAVENOUS
Status: CANCELLED | OUTPATIENT
Start: 2023-11-13

## 2023-10-12 RX ORDER — ACETAMINOPHEN 325 MG/1
650 TABLET ORAL
Status: CANCELLED | OUTPATIENT
Start: 2023-11-13

## 2023-10-12 RX ORDER — ALBUTEROL SULFATE 90 UG/1
4 AEROSOL, METERED RESPIRATORY (INHALATION) PRN
Status: CANCELLED | OUTPATIENT
Start: 2023-11-13

## 2023-10-12 RX ORDER — EPINEPHRINE 1 MG/ML
0.3 INJECTION, SOLUTION, CONCENTRATE INTRAVENOUS PRN
Status: CANCELLED | OUTPATIENT
Start: 2023-11-13

## 2023-10-12 RX ORDER — FAMOTIDINE 10 MG/ML
20 INJECTION, SOLUTION INTRAVENOUS
Status: CANCELLED | OUTPATIENT
Start: 2023-11-13

## 2023-10-12 RX ORDER — ONDANSETRON 2 MG/ML
8 INJECTION INTRAMUSCULAR; INTRAVENOUS
Status: CANCELLED | OUTPATIENT
Start: 2023-11-13

## 2023-10-26 ENCOUNTER — OFFICE VISIT (OUTPATIENT)
Dept: FAMILY MEDICINE CLINIC | Age: 63
End: 2023-10-26
Payer: MEDICARE

## 2023-10-26 VITALS
SYSTOLIC BLOOD PRESSURE: 106 MMHG | WEIGHT: 131 LBS | HEIGHT: 61 IN | DIASTOLIC BLOOD PRESSURE: 58 MMHG | BODY MASS INDEX: 24.73 KG/M2 | TEMPERATURE: 98.4 F | HEART RATE: 76 BPM

## 2023-10-26 DIAGNOSIS — J06.9 URI WITH COUGH AND CONGESTION: Primary | ICD-10-CM

## 2023-10-26 PROCEDURE — 99213 OFFICE O/P EST LOW 20 MIN: CPT | Performed by: PHYSICIAN ASSISTANT

## 2023-10-26 PROCEDURE — 1123F ACP DISCUSS/DSCN MKR DOCD: CPT | Performed by: PHYSICIAN ASSISTANT

## 2023-10-26 RX ORDER — AZITHROMYCIN 250 MG/1
TABLET, FILM COATED ORAL
Qty: 6 TABLET | Refills: 0 | Status: SHIPPED | OUTPATIENT
Start: 2023-10-26

## 2023-10-26 NOTE — PROGRESS NOTES
conjunctiva normal, no discharge, no scleral icterus  Neck:  No tenderness, supple  Lymphatic:  No lymphadenopathy noted  Cardiovascular:  Normal heart rate, normal rhythm, no murmurs, gallops or rubs  Thorax & Lungs:  Normal breath sounds, no respiratory distress, no wheezing, no rales, no rhonchi  Skin:  Warm, dry, no erythema, no rash  Neurologic:  Alert & oriented   Psychiatric:  Affect normal, mood normal    ASSESSMENT & PLAN    José Manuel Robles was seen today for uri. Diagnoses and all orders for this visit:    URI with cough and congestion  -     azithromycin (ZITHROMAX) 250 MG tablet; Use as directed       Discussed likely viral etiology given symptom duration of 72 hours. Declines COVID testing. Patient is adamant about prescription for Z-Hong. Watchful waiting encouraged. Rest, fluids, warm salt water gargles, nasal saline as needed. There are no discontinued medications. No follow-ups on file. Plan of care reviewed with patient who verbalizes understanding and wishes to continue. Patient to call with any questions or concerns. Please note that this chart was generated using dragon dictation software. Although every effort was made to ensure the accuracy of this automated transcription, some errors in transcription may have occurred.     Electronically signed by Luis Armando Márquez PA-C on 10/26/2023

## 2023-11-13 ENCOUNTER — HOSPITAL ENCOUNTER (OUTPATIENT)
Dept: INFUSION THERAPY | Age: 63
Setting detail: INFUSION SERIES
Discharge: HOME | End: 2023-11-13
Payer: MEDICARE

## 2023-11-13 VITALS
DIASTOLIC BLOOD PRESSURE: 52 MMHG | RESPIRATION RATE: 16 BRPM | TEMPERATURE: 97.7 F | SYSTOLIC BLOOD PRESSURE: 99 MMHG | OXYGEN SATURATION: 96 % | HEART RATE: 60 BPM

## 2023-11-13 DIAGNOSIS — M81.6 LOCALIZED OSTEOPOROSIS WITHOUT PATHOLOGICAL FRACTURE: Primary | ICD-10-CM

## 2023-11-13 PROCEDURE — 6360000002 HC RX W HCPCS

## 2023-11-13 PROCEDURE — 96372 THER/PROPH/DIAG INJ SC/IM: CPT

## 2023-11-13 RX ORDER — EPINEPHRINE 1 MG/ML
0.3 INJECTION, SOLUTION, CONCENTRATE INTRAVENOUS PRN
Status: CANCELLED | OUTPATIENT
Start: 2023-11-13

## 2023-11-13 RX ORDER — DIPHENHYDRAMINE HYDROCHLORIDE 50 MG/ML
50 INJECTION INTRAMUSCULAR; INTRAVENOUS
Status: CANCELLED | OUTPATIENT
Start: 2023-11-13

## 2023-11-13 RX ORDER — FAMOTIDINE 10 MG/ML
20 INJECTION, SOLUTION INTRAVENOUS
Status: CANCELLED | OUTPATIENT
Start: 2023-11-13

## 2023-11-13 RX ORDER — ACETAMINOPHEN 325 MG/1
650 TABLET ORAL
Status: CANCELLED | OUTPATIENT
Start: 2023-11-13

## 2023-11-13 RX ORDER — ONDANSETRON 2 MG/ML
8 INJECTION INTRAMUSCULAR; INTRAVENOUS
Status: CANCELLED | OUTPATIENT
Start: 2023-11-13

## 2023-11-13 RX ORDER — ALBUTEROL SULFATE 90 UG/1
4 AEROSOL, METERED RESPIRATORY (INHALATION) PRN
Status: CANCELLED | OUTPATIENT
Start: 2023-11-13

## 2023-11-13 RX ORDER — SODIUM CHLORIDE 9 MG/ML
INJECTION, SOLUTION INTRAVENOUS CONTINUOUS
Status: CANCELLED | OUTPATIENT
Start: 2023-11-13

## 2023-11-13 RX ADMIN — DENOSUMAB 60 MG: 60 INJECTION SUBCUTANEOUS at 13:10

## 2023-11-13 NOTE — DISCHARGE INSTRUCTIONS
Prolia  Increase liquid intake for the next 2-3 days, especially water. You may experience increase bone pain after injection  Take pain medication as directed   May be sore at injection site. Call your doctor should you experience jaw pain or new dental complaints  Continue home meds, diet and activity  Call your Doctor for any specific questions or problems. Prolia injection is given every 6 months    Thank you for choosing [facility]. It is our pleasure to serve you.       Hours 8:00 am - 5:00 pm  Phone Number: [phone]

## 2023-11-13 NOTE — PROGRESS NOTES
Ambulatory to unit room 3 for Prolia. Orientated to unit. Procedure and plan of care explained. Questions answered. Understanding verbalized. Tolerated  well. Reviewed discharge instructions, understanding verbalized. Copies of AVS given to take home. Patient discharged home. Down to exit per self.     Orders Placed This Encounter   Medications    denosumab (PROLIA) SC injection 60 mg

## 2023-12-08 DIAGNOSIS — I15.1 HYPERTENSION SECONDARY TO OTHER RENAL DISORDERS: ICD-10-CM

## 2023-12-08 DIAGNOSIS — N28.89 HYPERTENSION SECONDARY TO OTHER RENAL DISORDERS: ICD-10-CM

## 2024-02-01 ENCOUNTER — PATIENT MESSAGE (OUTPATIENT)
Dept: FAMILY MEDICINE CLINIC | Age: 64
End: 2024-02-01

## 2024-02-01 DIAGNOSIS — K59.00 CONSTIPATION, UNSPECIFIED CONSTIPATION TYPE: ICD-10-CM

## 2024-02-01 RX ORDER — PSEUDOEPHEDRINE HCL 30 MG
100 TABLET ORAL DAILY
Qty: 90 CAPSULE | Refills: 1 | Status: SHIPPED | OUTPATIENT
Start: 2024-02-01

## 2024-02-01 NOTE — TELEPHONE ENCOUNTER
From: Glendy Kaye  To: Dr. Leslie Chairez  Sent: 2/1/2024 1:21 PM EST  Subject: Prescription requests    Please process refill for :  Docusate Sodium 100 MG Softgel  at [place], [place]    Also, now that I am on Medicare, these prescriptions may be processed by mail for 90-day refills:  Metformin HCL 500MG Tablet (not needed until May)  Sertraline HCL 50MG Tablet  Montelukast Sod 10MG Tablet  They are to be ordered from [facility] [phone]. See attached copy of Rx card.    Please call me if there are questions.  Thank you, Beata Kaye

## 2024-03-14 ENCOUNTER — TELEPHONE (OUTPATIENT)
Dept: FAMILY MEDICINE CLINIC | Age: 64
End: 2024-03-14

## 2024-03-14 NOTE — TELEPHONE ENCOUNTER
LM for patient that LPN cannot do AWV yet since patient's Medicare B went into effect 9/1/2023. LM that her AWV is cancelled for today and request she call back to schedule for this fall, after 9/1/23.

## 2024-04-01 ENCOUNTER — OFFICE VISIT (OUTPATIENT)
Dept: FAMILY MEDICINE CLINIC | Age: 64
End: 2024-04-01
Payer: MEDICARE

## 2024-04-01 VITALS
DIASTOLIC BLOOD PRESSURE: 62 MMHG | SYSTOLIC BLOOD PRESSURE: 104 MMHG | BODY MASS INDEX: 25 KG/M2 | WEIGHT: 132.4 LBS | OXYGEN SATURATION: 98 % | HEART RATE: 57 BPM | HEIGHT: 61 IN

## 2024-04-01 DIAGNOSIS — Q61.3 POLYCYSTIC KIDNEY DISEASE: ICD-10-CM

## 2024-04-01 DIAGNOSIS — K59.00 CONSTIPATION, UNSPECIFIED CONSTIPATION TYPE: ICD-10-CM

## 2024-04-01 DIAGNOSIS — J30.2 SEASONAL ALLERGIC RHINITIS, UNSPECIFIED TRIGGER: ICD-10-CM

## 2024-04-01 DIAGNOSIS — F33.41 RECURRENT MAJOR DEPRESSIVE DISORDER, IN PARTIAL REMISSION (HCC): ICD-10-CM

## 2024-04-01 DIAGNOSIS — I15.1 HYPERTENSION SECONDARY TO OTHER RENAL DISORDERS: ICD-10-CM

## 2024-04-01 DIAGNOSIS — C43.71 MALIGNANT MELANOMA OF RIGHT LOWER EXTREMITY INCLUDING HIP (HCC): ICD-10-CM

## 2024-04-01 DIAGNOSIS — E11.9 TYPE 2 DIABETES MELLITUS WITHOUT COMPLICATION, WITHOUT LONG-TERM CURRENT USE OF INSULIN (HCC): ICD-10-CM

## 2024-04-01 DIAGNOSIS — Q44.6 POLYCYSTIC LIVER DISEASE: Primary | ICD-10-CM

## 2024-04-01 DIAGNOSIS — M81.6 LOCALIZED OSTEOPOROSIS WITHOUT CURRENT PATHOLOGICAL FRACTURE: ICD-10-CM

## 2024-04-01 PROCEDURE — 1123F ACP DISCUSS/DSCN MKR DOCD: CPT | Performed by: STUDENT IN AN ORGANIZED HEALTH CARE EDUCATION/TRAINING PROGRAM

## 2024-04-01 PROCEDURE — 99214 OFFICE O/P EST MOD 30 MIN: CPT | Performed by: STUDENT IN AN ORGANIZED HEALTH CARE EDUCATION/TRAINING PROGRAM

## 2024-04-01 RX ORDER — DULAGLUTIDE 0.75 MG/.5ML
0.75 INJECTION, SOLUTION SUBCUTANEOUS WEEKLY
Qty: 4 ADJUSTABLE DOSE PRE-FILLED PEN SYRINGE | Refills: 5 | Status: SHIPPED | OUTPATIENT
Start: 2024-04-01

## 2024-04-01 RX ORDER — PSEUDOEPHEDRINE HCL 30 MG
100 TABLET ORAL DAILY
Qty: 90 CAPSULE | Refills: 1 | Status: SHIPPED | OUTPATIENT
Start: 2024-04-01

## 2024-04-01 RX ORDER — MONTELUKAST SODIUM 10 MG/1
10 TABLET ORAL DAILY
Qty: 30 TABLET | Refills: 5 | Status: SHIPPED | OUTPATIENT
Start: 2024-04-01

## 2024-04-01 NOTE — PROGRESS NOTES
4/1/2024    Glendy Kaye    Chief Complaint   Patient presents with    6 Month Follow-Up     Type 2 dm        HPI  History was obtained from patient.  Glendy is a 63 y.o. female with a PMHx as listed below who presents today for 6 month follow up. No acute complaints.     Dermatitis on clobetasol following with [facility]   Followign with Dr. 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       1. Polycystic liver disease    2. Hypertension secondary to other renal disorders    3. Constipation, unspecified constipation type    4. Type 2 diabetes mellitus without complication, without long-term current use of insulin (HCC)    5. Seasonal allergic rhinitis, unspecified trigger    6. Recurrent major depressive disorder, in partial remission (HCC)    7. Polycystic kidney disease    8. Malignant melanoma of right lower extremity including hip (HCC)    9. Localized osteoporosis without current pathological fracture             REVIEW OF SYMPTOMS    Review of Systems   Constitutional:  Negative for chills and fatigue.   HENT:  Negative for congestion and sore throat.    Respiratory:  Negative for shortness of breath and wheezing.    Cardiovascular:  Negative for chest pain and palpitations.   Gastrointestinal:  Negative for abdominal pain and nausea.   Genitourinary:  Negative for frequency and urgency.   Neurological:  Negative for light-headedness.       PAST MEDICAL HISTORY  Past Medical History:   Diagnosis Date    Allergic rhinitis     Cancer (HCC)     melanoma right knee- removed 5/2016    Chronic kidney disease     \"to start dialysis on 7/26/2017- to do every MWF\" follow with Dr Gerard    Elevated blood uric acid level     End stage renal disease (HCC) 06/11/2016    Fibrocystic breast     History of migraine     \"stopped with periods stopped\"    Kidney

## 2024-04-12 DIAGNOSIS — M81.0 AGE-RELATED OSTEOPOROSIS WITHOUT CURRENT PATHOLOGICAL FRACTURE: Primary | ICD-10-CM

## 2024-04-12 RX ORDER — ONDANSETRON 2 MG/ML
8 INJECTION INTRAMUSCULAR; INTRAVENOUS
OUTPATIENT
Start: 2024-05-14

## 2024-04-12 RX ORDER — EPINEPHRINE 1 MG/ML
0.3 INJECTION, SOLUTION INTRAMUSCULAR; SUBCUTANEOUS PRN
OUTPATIENT
Start: 2024-05-14

## 2024-04-12 RX ORDER — ALBUTEROL SULFATE 90 UG/1
4 AEROSOL, METERED RESPIRATORY (INHALATION) PRN
OUTPATIENT
Start: 2024-05-14

## 2024-04-12 RX ORDER — DIPHENHYDRAMINE HYDROCHLORIDE 50 MG/ML
50 INJECTION INTRAMUSCULAR; INTRAVENOUS
OUTPATIENT
Start: 2024-05-14

## 2024-04-12 RX ORDER — SODIUM CHLORIDE 9 MG/ML
INJECTION, SOLUTION INTRAVENOUS CONTINUOUS
OUTPATIENT
Start: 2024-05-14

## 2024-04-12 RX ORDER — ACETAMINOPHEN 325 MG/1
650 TABLET ORAL
OUTPATIENT
Start: 2024-05-14

## 2024-04-12 RX ORDER — FAMOTIDINE 10 MG/ML
20 INJECTION, SOLUTION INTRAVENOUS
OUTPATIENT
Start: 2024-05-14

## 2024-04-25 ENCOUNTER — TELEPHONE (OUTPATIENT)
Dept: FAMILY MEDICINE CLINIC | Age: 64
End: 2024-04-25

## 2024-04-25 DIAGNOSIS — M81.6 LOCALIZED OSTEOPOROSIS WITHOUT PATHOLOGICAL FRACTURE: Primary | ICD-10-CM

## 2024-05-10 ENCOUNTER — TELEPHONE (OUTPATIENT)
Dept: FAMILY MEDICINE CLINIC | Age: 64
End: 2024-05-10

## 2024-06-12 ENCOUNTER — TELEPHONE (OUTPATIENT)
Dept: FAMILY MEDICINE CLINIC | Age: 64
End: 2024-06-12

## 2024-06-12 NOTE — TELEPHONE ENCOUNTER
To Dr. Kena Rowell, at [facility], said they cannot schedule patient for a Prolia shot until the patient completes a Dexa Scan.  Could you reach out to patient and ask them to get the Dexa Scan that has been ordered.    Please call Lelia with any questions.     Phone:   [phone] ---Ext#  [phone]

## 2024-06-12 NOTE — TELEPHONE ENCOUNTER
Patient notified and verbalized understanding, will fax DEXA order to [facility] per patient request.

## 2024-06-24 ENCOUNTER — TELEPHONE (OUTPATIENT)
Dept: FAMILY MEDICINE CLINIC | Age: 64
End: 2024-06-24

## 2024-06-24 NOTE — TELEPHONE ENCOUNTER
INFUSION DEPT CALLED AND STATED PT HASN'T GOTTEN HER DEXA SCAN DONE YET AND THEY CAN'T SCHED HER PROLIA UNTIL THAT'S DONE. SPOKE WITH PT AND SHE IS OUT OF TOWN AND WILL GET IT DONE WHEN SHE RETURNS

## 2024-07-12 ENCOUNTER — HOSPITAL ENCOUNTER (OUTPATIENT)
Dept: WOMENS IMAGING | Age: 64
Discharge: HOME | End: 2024-07-12
Attending: STUDENT IN AN ORGANIZED HEALTH CARE EDUCATION/TRAINING PROGRAM
Payer: MEDICARE

## 2024-07-12 DIAGNOSIS — M81.6 LOCALIZED OSTEOPOROSIS WITHOUT PATHOLOGICAL FRACTURE: ICD-10-CM

## 2024-07-12 PROCEDURE — 77080 DXA BONE DENSITY AXIAL: CPT

## 2024-07-29 ENCOUNTER — HOSPITAL ENCOUNTER (OUTPATIENT)
Dept: INFUSION THERAPY | Age: 64
Setting detail: INFUSION SERIES
Discharge: HOME | End: 2024-07-29
Payer: MEDICARE

## 2024-07-29 VITALS
SYSTOLIC BLOOD PRESSURE: 107 MMHG | DIASTOLIC BLOOD PRESSURE: 59 MMHG | OXYGEN SATURATION: 96 % | TEMPERATURE: 97.7 F | HEART RATE: 60 BPM | RESPIRATION RATE: 16 BRPM

## 2024-07-29 DIAGNOSIS — M81.0 AGE-RELATED OSTEOPOROSIS WITHOUT CURRENT PATHOLOGICAL FRACTURE: Primary | ICD-10-CM

## 2024-07-29 PROCEDURE — 96372 THER/PROPH/DIAG INJ SC/IM: CPT

## 2024-07-29 PROCEDURE — 6360000002 HC RX W HCPCS: Performed by: STUDENT IN AN ORGANIZED HEALTH CARE EDUCATION/TRAINING PROGRAM

## 2024-07-29 RX ORDER — EPINEPHRINE 1 MG/ML
0.3 INJECTION, SOLUTION INTRAMUSCULAR; SUBCUTANEOUS PRN
Status: CANCELLED | OUTPATIENT
Start: 2024-07-29

## 2024-07-29 RX ORDER — ALBUTEROL SULFATE 90 UG/1
4 AEROSOL, METERED RESPIRATORY (INHALATION) PRN
Status: CANCELLED | OUTPATIENT
Start: 2024-07-29

## 2024-07-29 RX ORDER — ACETAMINOPHEN 325 MG/1
650 TABLET ORAL
Status: CANCELLED | OUTPATIENT
Start: 2024-07-29

## 2024-07-29 RX ORDER — ONDANSETRON 2 MG/ML
8 INJECTION INTRAMUSCULAR; INTRAVENOUS
Status: CANCELLED | OUTPATIENT
Start: 2024-07-29

## 2024-07-29 RX ORDER — FAMOTIDINE 10 MG/ML
20 INJECTION, SOLUTION INTRAVENOUS
Status: CANCELLED | OUTPATIENT
Start: 2024-07-29

## 2024-07-29 RX ORDER — SODIUM CHLORIDE 9 MG/ML
INJECTION, SOLUTION INTRAVENOUS CONTINUOUS
Status: CANCELLED | OUTPATIENT
Start: 2024-07-29

## 2024-07-29 RX ORDER — DIPHENHYDRAMINE HYDROCHLORIDE 50 MG/ML
50 INJECTION INTRAMUSCULAR; INTRAVENOUS
Status: CANCELLED | OUTPATIENT
Start: 2024-07-29

## 2024-07-29 RX ADMIN — DENOSUMAB 60 MG: 60 INJECTION SUBCUTANEOUS at 11:10

## 2024-07-29 NOTE — DISCHARGE INSTRUCTIONS
Prolia  Increase liquid intake for the next 2-3 days, especially water.  You may experience increase bone pain after injection  Take pain medication as directed   May be sore at injection site.  Call your doctor should you experience jaw pain or new dental complaints  Continue home meds, diet and activity  Call your Doctor for any specific questions or problems.  Prolia injection is given every 6 months    Thank you for choosing [facility] unit. It is our pleasure to serve you.      Hours 8:00 am - 5:00 pm  Phone Number: [phone]

## 2024-07-29 NOTE — PROGRESS NOTES
Ambulatory to unit room 2 for Prolia.Orientated to unit.Procedure and plan of care explained.Questions answered.Understanding verbalized.Tolerated  well.Reviewed discharge instructions, understanding verbalized.Copies of AVS given to take home. Patient discharged home.Down to exit per self.    Orders Placed This Encounter   Medications    denosumab (PROLIA) SC injection 60 mg

## 2024-08-16 DIAGNOSIS — E11.9 TYPE 2 DIABETES MELLITUS WITHOUT COMPLICATION, WITHOUT LONG-TERM CURRENT USE OF INSULIN (HCC): ICD-10-CM

## 2024-08-16 RX ORDER — DULAGLUTIDE 0.75 MG/.5ML
INJECTION, SOLUTION SUBCUTANEOUS
Refills: 1 | OUTPATIENT
Start: 2024-08-16

## 2024-09-28 DIAGNOSIS — F33.41 RECURRENT MAJOR DEPRESSIVE DISORDER, IN PARTIAL REMISSION (HCC): ICD-10-CM

## 2024-10-01 ENCOUNTER — OFFICE VISIT (OUTPATIENT)
Dept: FAMILY MEDICINE CLINIC | Age: 64
End: 2024-10-01
Payer: MEDICARE

## 2024-10-01 VITALS
OXYGEN SATURATION: 97 % | HEIGHT: 61 IN | HEART RATE: 65 BPM | DIASTOLIC BLOOD PRESSURE: 68 MMHG | WEIGHT: 136.2 LBS | SYSTOLIC BLOOD PRESSURE: 110 MMHG | BODY MASS INDEX: 25.71 KG/M2

## 2024-10-01 DIAGNOSIS — I15.1 HYPERTENSION SECONDARY TO OTHER RENAL DISORDERS: ICD-10-CM

## 2024-10-01 DIAGNOSIS — M81.6 LOCALIZED OSTEOPOROSIS WITHOUT PATHOLOGICAL FRACTURE: Primary | ICD-10-CM

## 2024-10-01 DIAGNOSIS — E11.9 TYPE 2 DIABETES MELLITUS WITHOUT COMPLICATION, WITHOUT LONG-TERM CURRENT USE OF INSULIN (HCC): ICD-10-CM

## 2024-10-01 DIAGNOSIS — Q44.6 POLYCYSTIC LIVER DISEASE: ICD-10-CM

## 2024-10-01 DIAGNOSIS — J30.2 SEASONAL ALLERGIC RHINITIS, UNSPECIFIED TRIGGER: ICD-10-CM

## 2024-10-01 DIAGNOSIS — F33.41 RECURRENT MAJOR DEPRESSIVE DISORDER, IN PARTIAL REMISSION (HCC): ICD-10-CM

## 2024-10-01 DIAGNOSIS — Z23 ENCOUNTER FOR IMMUNIZATION: ICD-10-CM

## 2024-10-01 PROCEDURE — G0008 ADMIN INFLUENZA VIRUS VAC: HCPCS | Performed by: STUDENT IN AN ORGANIZED HEALTH CARE EDUCATION/TRAINING PROGRAM

## 2024-10-01 PROCEDURE — 3051F HG A1C>EQUAL 7.0%<8.0%: CPT | Performed by: STUDENT IN AN ORGANIZED HEALTH CARE EDUCATION/TRAINING PROGRAM

## 2024-10-01 PROCEDURE — 1123F ACP DISCUSS/DSCN MKR DOCD: CPT | Performed by: STUDENT IN AN ORGANIZED HEALTH CARE EDUCATION/TRAINING PROGRAM

## 2024-10-01 PROCEDURE — 90653 IIV ADJUVANT VACCINE IM: CPT | Performed by: STUDENT IN AN ORGANIZED HEALTH CARE EDUCATION/TRAINING PROGRAM

## 2024-10-01 PROCEDURE — 99214 OFFICE O/P EST MOD 30 MIN: CPT | Performed by: STUDENT IN AN ORGANIZED HEALTH CARE EDUCATION/TRAINING PROGRAM

## 2024-10-01 RX ORDER — DULAGLUTIDE 0.75 MG/.5ML
0.75 INJECTION, SOLUTION SUBCUTANEOUS WEEKLY
Qty: 4 ADJUSTABLE DOSE PRE-FILLED PEN SYRINGE | Refills: 5 | Status: SHIPPED | OUTPATIENT
Start: 2024-10-01

## 2024-10-01 RX ORDER — MONTELUKAST SODIUM 10 MG/1
10 TABLET ORAL DAILY
Qty: 30 TABLET | Refills: 5 | Status: SHIPPED | OUTPATIENT
Start: 2024-10-01

## 2024-10-01 SDOH — ECONOMIC STABILITY: FOOD INSECURITY: WITHIN THE PAST 12 MONTHS, YOU WORRIED THAT YOUR FOOD WOULD RUN OUT BEFORE YOU GOT MONEY TO BUY MORE.: NEVER TRUE

## 2024-10-01 SDOH — ECONOMIC STABILITY: INCOME INSECURITY: HOW HARD IS IT FOR YOU TO PAY FOR THE VERY BASICS LIKE FOOD, HOUSING, MEDICAL CARE, AND HEATING?: NOT HARD AT ALL

## 2024-10-01 SDOH — ECONOMIC STABILITY: FOOD INSECURITY: WITHIN THE PAST 12 MONTHS, THE FOOD YOU BOUGHT JUST DIDN'T LAST AND YOU DIDN'T HAVE MONEY TO GET MORE.: NEVER TRUE

## 2024-10-01 ASSESSMENT — PATIENT HEALTH QUESTIONNAIRE - PHQ9
1. LITTLE INTEREST OR PLEASURE IN DOING THINGS: NOT AT ALL
SUM OF ALL RESPONSES TO PHQ QUESTIONS 1-9: 0
8. MOVING OR SPEAKING SO SLOWLY THAT OTHER PEOPLE COULD HAVE NOTICED. OR THE OPPOSITE, BEING SO FIGETY OR RESTLESS THAT YOU HAVE BEEN MOVING AROUND A LOT MORE THAN USUAL: NOT AT ALL
5. POOR APPETITE OR OVEREATING: NOT AT ALL
4. FEELING TIRED OR HAVING LITTLE ENERGY: NOT AT ALL
SUM OF ALL RESPONSES TO PHQ QUESTIONS 1-9: 0
SUM OF ALL RESPONSES TO PHQ QUESTIONS 1-9: 0
6. FEELING BAD ABOUT YOURSELF - OR THAT YOU ARE A FAILURE OR HAVE LET YOURSELF OR YOUR FAMILY DOWN: NOT AT ALL
SUM OF ALL RESPONSES TO PHQ QUESTIONS 1-9: 0
7. TROUBLE CONCENTRATING ON THINGS, SUCH AS READING THE NEWSPAPER OR WATCHING TELEVISION: NOT AT ALL
SUM OF ALL RESPONSES TO PHQ9 QUESTIONS 1 & 2: 0
10. IF YOU CHECKED OFF ANY PROBLEMS, HOW DIFFICULT HAVE THESE PROBLEMS MADE IT FOR YOU TO DO YOUR WORK, TAKE CARE OF THINGS AT HOME, OR GET ALONG WITH OTHER PEOPLE: NOT DIFFICULT AT ALL
2. FEELING DOWN, DEPRESSED OR HOPELESS: NOT AT ALL
3. TROUBLE FALLING OR STAYING ASLEEP: NOT AT ALL
9. THOUGHTS THAT YOU WOULD BE BETTER OFF DEAD, OR OF HURTING YOURSELF: NOT AT ALL

## 2024-10-01 ASSESSMENT — LIFESTYLE VARIABLES
HOW MANY STANDARD DRINKS CONTAINING ALCOHOL DO YOU HAVE ON A TYPICAL DAY: PATIENT DOES NOT DRINK
HOW OFTEN DO YOU HAVE A DRINK CONTAINING ALCOHOL: NEVER

## 2024-10-01 NOTE — PROGRESS NOTES
Medicare Annual Wellness Visit    Glendy Kaye is here for Medicare AWV    Assessment & Plan   Localized osteoporosis without pathological fracture  Type 2 diabetes mellitus without complication, without long-term current use of insulin (HCC)  -     dulaglutide (TRULICITY) 0.75 MG/0.5ML SOPN SC injection; Inject 0.5 mLs into the skin once a week, Disp-4 Adjustable Dose Pre-filled Pen Syringe, R-5Normal  -     metFORMIN (GLUCOPHAGE) 500 MG tablet; Take 1 tablet by mouth 2 times daily (with meals), Disp-180 tablet, R-1Normal  -     Basic Metabolic Panel; Future  -     Hemoglobin A1C; Future  -     TSH with Reflex; Future  Seasonal allergic rhinitis, unspecified trigger  -     montelukast (SINGULAIR) 10 MG tablet; Take 1 tablet by mouth daily, Disp-30 tablet, R-5Normal  Recurrent major depressive disorder, in partial remission (HCC)  -     sertraline (ZOLOFT) 50 MG tablet; Take 1 tablet by mouth daily, Disp-30 tablet, R-5Normal  Hypertension secondary to other renal disorders  -     Calcium Carb-Cholecalciferol (OYSTER SHELL CALCIUM W/D) 500-5 MG-MCG TABS tablet; Take 1 tablet by mouth daily, Disp-30 tablet, R-5Normal  Polycystic liver disease  Encounter for immunization  -     Influenza, FLUAD Trivalent, (age 65 y+), IM, Preservative Free, 0.5mL    Recommendations for Preventive Services Due: see orders and patient instructions/AVS.  Recommended screening schedule for the next 5-10 years is provided to the patient in written form: see Patient Instructions/AVS.     No follow-ups on file.     Subjective       Patient's complete Health Risk Assessment and screening values have been reviewed and are found in Flowsheets. The following problems were reviewed today and where indicated follow up appointments were made and/or referrals ordered.    Positive Risk Factor Screenings with Interventions:                Inactivity:  On average, how many days per week do you engage in moderate to strenuous exercise (like a brisk 
sertraline (ZOLOFT) 50 MG tablet; Take 1 tablet by mouth daily  Dispense: 30 tablet; Refill: 5    4. Hypertension secondary to other renal disorders    - Calcium Carb-Cholecalciferol (OYSTER SHELL CALCIUM W/D) 500-5 MG-MCG TABS tablet; Take 1 tablet by mouth daily  Dispense: 30 tablet; Refill: 5    5. Localized osteoporosis without pathological fracture    Increase metformin to 1000mg recheck in 3 months, dm2 mild elevation  Continue to follow with Dr. Gerard   Continue to follow with [facility] transplant  DEXA 7/2024 Osteopenic minimal change from prior continue prolia      Hemoglobin A1C   Date Value Ref Range Status   09/21/2024 7.4 % Final         No follow-ups on file.         Electronically signed by Leslie Chairez DO on 10/1/2024

## 2024-10-15 ASSESSMENT — ENCOUNTER SYMPTOMS
SORE THROAT: 0
SHORTNESS OF BREATH: 0
NAUSEA: 0
WHEEZING: 0
ABDOMINAL PAIN: 0

## 2024-10-25 LAB
BUN BLDV-MCNC: 19 MG/DL
CALCIUM SERPL-MCNC: 9.4 MG/DL
CHLORIDE BLD-SCNC: 106 MMOL/L
CO2: 19 MMOL/L
CREAT SERPL-MCNC: 0.85 MG/DL
EGFR: 76
ESTIMATED AVERAGE GLUCOSE: NORMAL
GLUCOSE BLD-MCNC: 144 MG/DL
HBA1C MFR BLD: 7 %
POTASSIUM SERPL-SCNC: 4.3 MMOL/L
SODIUM BLD-SCNC: 139 MMOL/L
TSH SERPL DL<=0.05 MIU/L-ACNC: 2.94 UIU/ML

## 2024-10-28 DIAGNOSIS — E11.9 TYPE 2 DIABETES MELLITUS WITHOUT COMPLICATION, WITHOUT LONG-TERM CURRENT USE OF INSULIN (HCC): ICD-10-CM

## 2024-12-04 ENCOUNTER — OFFICE VISIT (OUTPATIENT)
Dept: FAMILY MEDICINE CLINIC | Age: 64
End: 2024-12-04
Payer: MEDICARE

## 2024-12-04 VITALS
HEIGHT: 61 IN | DIASTOLIC BLOOD PRESSURE: 66 MMHG | OXYGEN SATURATION: 95 % | SYSTOLIC BLOOD PRESSURE: 122 MMHG | TEMPERATURE: 98.7 F | HEART RATE: 64 BPM | BODY MASS INDEX: 25.49 KG/M2 | WEIGHT: 135 LBS

## 2024-12-04 DIAGNOSIS — J06.9 URI WITH COUGH AND CONGESTION: Primary | ICD-10-CM

## 2024-12-04 PROCEDURE — 1123F ACP DISCUSS/DSCN MKR DOCD: CPT | Performed by: PHYSICIAN ASSISTANT

## 2024-12-04 PROCEDURE — 1159F MED LIST DOCD IN RCRD: CPT | Performed by: PHYSICIAN ASSISTANT

## 2024-12-04 PROCEDURE — 99213 OFFICE O/P EST LOW 20 MIN: CPT | Performed by: PHYSICIAN ASSISTANT

## 2024-12-04 PROCEDURE — 1160F RVW MEDS BY RX/DR IN RCRD: CPT | Performed by: PHYSICIAN ASSISTANT

## 2024-12-04 RX ORDER — AZITHROMYCIN 250 MG/1
TABLET, FILM COATED ORAL
Qty: 6 TABLET | Refills: 0 | Status: SHIPPED | OUTPATIENT
Start: 2024-12-04

## 2024-12-04 NOTE — PROGRESS NOTES
2024    Glendy Kaye    Chief Complaint   Patient presents with    Cold Symptoms     - sinus pain & pressure, pressure bilateral ear, runny nose, sore throat, productive cough, denies chest sx's, is having slight sob, dizziness, denies body aches or chilling, denies gi sx's. Sx's started 12/3/24. Tried tylenol - helped HA.        HPI  History was obtained from patient.   Glendy is a 64 y.o. female who presents today with complaints of 24 hour hx of nasal congestion, runny nose, bilateral ear pressure, sore throat, productive cough, dizziness.  She feels winded with coughing.  Denies any acute dyspnea, wheezing.  Denies hemoptysis or fevers but states 98 °F is a high temperature for her.  She denies nausea, vomiting, bowel changes.  She states \"this is my allergies and I just need a Z-Hong.\"  She is declining any viral respiratory testing despite discussion that we are seeing multiple respiratory viral etiologies in the community right now.  She declines offer for for Medrol Dosepak.        PAST MEDICAL HISTORY  Past Medical History:   Diagnosis Date    Allergic rhinitis     Cancer (HCC)     melanoma right knee- removed 2016    Chronic kidney disease     \"to start dialysis on 2017- to do every MWF\" follow with Dr Gerard    Elevated blood uric acid level     End stage renal disease (HCC) 2016    Fibrocystic breast     History of migraine     \"stopped with periods stopped\"    Kidney stone     Malignant melanoma of right knee (HCC)     Metabolic acidosis 2016    Osteopenia     Polycystic kidney disease     Post-menopausal     Prolonged emergence from general anesthesia     \"took me awhile to come out of anesthesia with fistula surgery\"    Reactive airway disease     Spinal headache     \"with my first \"       FAMILY HISTORY  Family History   Problem Relation Age of Onset    Cancer Mother         ovarian cancer    Diabetes Mother     High Blood Pressure Father     Kidney Disease

## 2024-12-27 RX ORDER — ONDANSETRON 2 MG/ML
8 INJECTION INTRAMUSCULAR; INTRAVENOUS
OUTPATIENT
Start: 2025-01-30

## 2024-12-27 RX ORDER — ALBUTEROL SULFATE 90 UG/1
4 INHALANT RESPIRATORY (INHALATION) PRN
OUTPATIENT
Start: 2025-01-30

## 2024-12-27 RX ORDER — ACETAMINOPHEN 325 MG/1
650 TABLET ORAL
OUTPATIENT
Start: 2025-01-30

## 2024-12-27 RX ORDER — EPINEPHRINE 1 MG/ML
0.3 INJECTION, SOLUTION INTRAMUSCULAR; SUBCUTANEOUS PRN
OUTPATIENT
Start: 2025-01-30

## 2024-12-27 RX ORDER — DIPHENHYDRAMINE HYDROCHLORIDE 50 MG/ML
50 INJECTION INTRAMUSCULAR; INTRAVENOUS
OUTPATIENT
Start: 2025-01-30

## 2024-12-27 RX ORDER — SODIUM CHLORIDE 9 MG/ML
INJECTION, SOLUTION INTRAVENOUS CONTINUOUS
OUTPATIENT
Start: 2025-01-30

## 2024-12-27 RX ORDER — HYDROCORTISONE SODIUM SUCCINATE 100 MG/2ML
100 INJECTION INTRAMUSCULAR; INTRAVENOUS
OUTPATIENT
Start: 2025-01-30

## 2024-12-27 RX ORDER — FAMOTIDINE 10 MG/ML
20 INJECTION, SOLUTION INTRAVENOUS
OUTPATIENT
Start: 2025-01-30

## 2025-01-03 DIAGNOSIS — E11.9 TYPE 2 DIABETES MELLITUS WITHOUT COMPLICATION, WITHOUT LONG-TERM CURRENT USE OF INSULIN (HCC): ICD-10-CM

## 2025-01-03 RX ORDER — UBIQUINOL 100 MG
1 CAPSULE ORAL 2 TIMES DAILY
Qty: 180 EACH | Refills: 1 | Status: SHIPPED | OUTPATIENT
Start: 2025-01-03 | End: 2025-07-02

## 2025-01-07 ENCOUNTER — OFFICE VISIT (OUTPATIENT)
Dept: FAMILY MEDICINE CLINIC | Age: 65
End: 2025-01-07

## 2025-01-07 ENCOUNTER — TELEPHONE (OUTPATIENT)
Dept: CARDIOLOGY CLINIC | Age: 65
End: 2025-01-07

## 2025-01-07 VITALS
HEART RATE: 57 BPM | OXYGEN SATURATION: 98 % | DIASTOLIC BLOOD PRESSURE: 52 MMHG | HEIGHT: 61 IN | WEIGHT: 131.4 LBS | SYSTOLIC BLOOD PRESSURE: 114 MMHG | BODY MASS INDEX: 24.81 KG/M2

## 2025-01-07 DIAGNOSIS — K59.00 CONSTIPATION, UNSPECIFIED CONSTIPATION TYPE: ICD-10-CM

## 2025-01-07 DIAGNOSIS — E11.9 TYPE 2 DIABETES MELLITUS WITHOUT COMPLICATION, WITHOUT LONG-TERM CURRENT USE OF INSULIN (HCC): ICD-10-CM

## 2025-01-07 DIAGNOSIS — Z00.00 INITIAL MEDICARE ANNUAL WELLNESS VISIT: Primary | ICD-10-CM

## 2025-01-07 DIAGNOSIS — J30.2 SEASONAL ALLERGIC RHINITIS, UNSPECIFIED TRIGGER: ICD-10-CM

## 2025-01-07 DIAGNOSIS — I15.1 HYPERTENSION SECONDARY TO OTHER RENAL DISORDERS: ICD-10-CM

## 2025-01-07 DIAGNOSIS — R01.1 HEART MURMUR, SYSTOLIC: ICD-10-CM

## 2025-01-07 DIAGNOSIS — M81.6 LOCALIZED OSTEOPOROSIS WITHOUT CURRENT PATHOLOGICAL FRACTURE: ICD-10-CM

## 2025-01-07 DIAGNOSIS — F33.41 RECURRENT MAJOR DEPRESSIVE DISORDER, IN PARTIAL REMISSION (HCC): ICD-10-CM

## 2025-01-07 DIAGNOSIS — E78.2 MODERATE MIXED HYPERLIPIDEMIA NOT REQUIRING STATIN THERAPY: ICD-10-CM

## 2025-01-07 RX ORDER — PSEUDOEPHEDRINE HCL 30 MG
100 TABLET ORAL DAILY
Qty: 90 CAPSULE | Refills: 1 | Status: SHIPPED | OUTPATIENT
Start: 2025-01-07

## 2025-01-07 RX ORDER — MONTELUKAST SODIUM 10 MG/1
10 TABLET ORAL DAILY
Qty: 90 TABLET | Refills: 1 | Status: SHIPPED | OUTPATIENT
Start: 2025-01-07

## 2025-01-07 ASSESSMENT — PATIENT HEALTH QUESTIONNAIRE - PHQ9
SUM OF ALL RESPONSES TO PHQ QUESTIONS 1-9: 6
9. THOUGHTS THAT YOU WOULD BE BETTER OFF DEAD, OR OF HURTING YOURSELF: NOT AT ALL
SUM OF ALL RESPONSES TO PHQ9 QUESTIONS 1 & 2: 1
8. MOVING OR SPEAKING SO SLOWLY THAT OTHER PEOPLE COULD HAVE NOTICED. OR THE OPPOSITE, BEING SO FIGETY OR RESTLESS THAT YOU HAVE BEEN MOVING AROUND A LOT MORE THAN USUAL: NOT AT ALL
5. POOR APPETITE OR OVEREATING: SEVERAL DAYS
6. FEELING BAD ABOUT YOURSELF - OR THAT YOU ARE A FAILURE OR HAVE LET YOURSELF OR YOUR FAMILY DOWN: SEVERAL DAYS
SUM OF ALL RESPONSES TO PHQ QUESTIONS 1-9: 6
SUM OF ALL RESPONSES TO PHQ QUESTIONS 1-9: 6
3. TROUBLE FALLING OR STAYING ASLEEP: SEVERAL DAYS
10. IF YOU CHECKED OFF ANY PROBLEMS, HOW DIFFICULT HAVE THESE PROBLEMS MADE IT FOR YOU TO DO YOUR WORK, TAKE CARE OF THINGS AT HOME, OR GET ALONG WITH OTHER PEOPLE: SOMEWHAT DIFFICULT
7. TROUBLE CONCENTRATING ON THINGS, SUCH AS READING THE NEWSPAPER OR WATCHING TELEVISION: SEVERAL DAYS
SUM OF ALL RESPONSES TO PHQ QUESTIONS 1-9: 6
4. FEELING TIRED OR HAVING LITTLE ENERGY: SEVERAL DAYS
1. LITTLE INTEREST OR PLEASURE IN DOING THINGS: NOT AT ALL
2. FEELING DOWN, DEPRESSED OR HOPELESS: SEVERAL DAYS

## 2025-01-07 ASSESSMENT — LIFESTYLE VARIABLES
HOW OFTEN DO YOU HAVE A DRINK CONTAINING ALCOHOL: NEVER
HOW MANY STANDARD DRINKS CONTAINING ALCOHOL DO YOU HAVE ON A TYPICAL DAY: PATIENT DOES NOT DRINK

## 2025-01-07 NOTE — PATIENT INSTRUCTIONS
aspirin. Wait for an ambulance. Do not try to drive yourself.  Watch closely for changes in your health, and be sure to contact your doctor if you have any problems.  Where can you learn more?  Go to https://www.[employer].net/patientEd and enter F075 to learn more about \"A Healthy Heart: Care Instructions.\"  Current as of: June 24, 2023  Content Version: 14.2  © 2024 [employer].   Care instructions adapted under license by [employer]. If you have questions about a medical condition or this instruction, always ask your healthcare professional. [employer] disclaims any warranty or liability for your use of this information.      Personalized Preventive Plan for Glendy Kaye - 1/7/2025  Medicare offers a range of preventive health benefits. Some of the tests and screenings are paid in full while other may be subject to a deductible, co-insurance, and/or copay.  Some of these benefits include a comprehensive review of your medical history including lifestyle, illnesses that may run in your family, and various assessments and screenings as appropriate.  After reviewing your medical record and screening and assessments performed today your provider may have ordered immunizations, labs, imaging, and/or referrals for you.  A list of these orders (if applicable) as well as your Preventive Care list are included within your After Visit Summary for your review.

## 2025-01-07 NOTE — PROGRESS NOTES
1/7/2025    Glendy Kaye    Chief Complaint   Patient presents with    3 Month Follow-Up     Localized osteoporosis without pathological fracture, type 2 dm     Medicare AWV       EAMON Pike is a 64 y.o. female with a PMHx as listed below who presents today for follow up on chronic conditions. No acute complaints. Doing well on current medications.     Obtaining prolia injections  History of Present Illness        1. Localized osteoporosis without current pathological fracture    2. Hypertension secondary to other renal disorders    3. Constipation, unspecified constipation type    4. Type 2 diabetes mellitus without complication, without long-term current use of insulin ([facility])    5. Seasonal allergic rhinitis, unspecified trigger    6. Recurrent major depressive disorder, in partial remission ([facility])    7. Moderate mixed hyperlipidemia not requiring statin therapy    8. Heart murmur, systolic             REVIEW OF SYMPTOMS    Review of Systems   Constitutional:  Negative for chills and fatigue.   HENT:  Negative for congestion and sore throat.    Respiratory:  Negative for shortness of breath and wheezing.    Cardiovascular:  Negative for chest pain and palpitations.   Gastrointestinal:  Negative for abdominal pain and nausea.   Genitourinary:  Negative for frequency and urgency.   Neurological:  Negative for light-headedness.       PAST MEDICAL HISTORY  Past Medical History:   Diagnosis Date    Allergic rhinitis     Cancer (HCC)     melanoma right knee- removed 5/2016    Chronic kidney disease     \"to start dialysis on 7/26/2017- to do every MWF\" follow with Dr Gerard    Elevated blood uric acid level     End stage renal disease (HCC) 06/11/2016    Fibrocystic breast     History of migraine     \"stopped with periods stopped\"    Kidney stone     Malignant melanoma of right knee (HCC)     Metabolic acidosis 09/19/2016    Osteopenia     Polycystic kidney disease     Post-menopausal     Prolonged emergence from

## 2025-01-07 NOTE — TELEPHONE ENCOUNTER
Left a Vm for Pt to call back and schedule with the first available provider.      Referral by Dr. Chairez  DX: Heart murmur,systolic.

## 2025-01-23 PROBLEM — Z94.4 HISTORY OF LIVER TRANSPLANT (HCC): Status: ACTIVE | Noted: 2025-01-23

## 2025-01-23 PROBLEM — Z94.0 HISTORY OF RENAL TRANSPLANT: Status: ACTIVE | Noted: 2025-01-23

## 2025-01-23 PROBLEM — T82.590A DIALYSIS AV FISTULA MALFUNCTION (HCC): Status: ACTIVE | Noted: 2025-01-23

## 2025-01-24 LAB
AMBIGUOUS ABBREVIATION: NORMAL
CHOLESTEROL, TOTAL: 111 MG/DL (ref 100–199)
HBA1C MFR BLD: 7.1 % (ref 4.8–5.6)
HDLC SERPL-MCNC: 53 MG/DL
LDL CHOLESTEROL: 40 MG/DL (ref 0–99)
TRIGL SERPL-MCNC: 98 MG/DL (ref 0–149)
VLDLC SERPL CALC-MCNC: 18 MG/DL (ref 5–40)

## 2025-01-28 ENCOUNTER — INITIAL CONSULT (OUTPATIENT)
Dept: CARDIOLOGY CLINIC | Age: 65
End: 2025-01-28
Payer: MEDICARE

## 2025-01-28 VITALS
HEART RATE: 55 BPM | OXYGEN SATURATION: 98 % | DIASTOLIC BLOOD PRESSURE: 58 MMHG | HEIGHT: 62 IN | BODY MASS INDEX: 24.33 KG/M2 | WEIGHT: 132.2 LBS | SYSTOLIC BLOOD PRESSURE: 122 MMHG

## 2025-01-28 DIAGNOSIS — R01.1 HEART MURMUR: Primary | ICD-10-CM

## 2025-01-28 DIAGNOSIS — R94.31 ABNORMAL ELECTROCARDIOGRAPHY: ICD-10-CM

## 2025-01-28 PROCEDURE — 1159F MED LIST DOCD IN RCRD: CPT | Performed by: INTERNAL MEDICINE

## 2025-01-28 PROCEDURE — 1123F ACP DISCUSS/DSCN MKR DOCD: CPT | Performed by: INTERNAL MEDICINE

## 2025-01-28 PROCEDURE — 99204 OFFICE O/P NEW MOD 45 MIN: CPT | Performed by: INTERNAL MEDICINE

## 2025-01-28 PROCEDURE — 93000 ELECTROCARDIOGRAM COMPLETE: CPT | Performed by: INTERNAL MEDICINE

## 2025-01-28 NOTE — PROGRESS NOTES
CARDIOLOGY NOTE      1/28/2025    RE: Glendy Kaye  (1960)                               TO:  Leslie Galarza R, DO            CHIEF COMPLAINT   Glendy is a 64 y.o. female who was seen today for management of murmur                                    HPI:                   Pt has h/o hypertension, hyperlipidemia, kidney transplant in 2018 infected is a kidney and liver transplant both, seen today for former. Pt has no cardiac complaints  Has polycystic kidney and liver disease  .\[facility]\""se  Glendy Kaye has the following history recorded in care path:  Patient Active Problem List    Diagnosis Date Noted    Localized osteoporosis without pathological fracture 10/31/2022    Dialysis AV fistula malfunction (HCC) 01/23/2025    History of renal transplant 01/23/2025    History of liver transplant (HCC) 01/23/2025    Age-related osteoporosis without current pathological fracture 04/12/2024    Polycystic liver disease 04/01/2024    Polycystic kidney disease 04/01/2024    Localized osteoporosis 10/29/2021    Allergic rhinitis     Hypertension secondary to other renal disorders     Kidney stone     Malignant melanoma of right knee (HCC)     Osteopenia     Reactive airway disease     Malignant melanoma of right lower extremity including hip (HCC)     Recurrent major depressive disorder, in partial remission (HCC)     Elevated blood uric acid level     Fibrocystic breast     History of migraine     Post-menopausal     Prolonged emergence from general anesthesia     Type 2 diabetes mellitus without complication, without long-term current use of insulin (HCC) 03/04/2019    Liver transplant status (HCC) 11/05/2018    Kidney transplant status 11/05/2018    Chronic kidney disease (CKD) stage G1/A1, glomerular filtration rate (GFR) equal to or greater than 90 mL/min/1.73 square meter and albuminuria creatinine ratio less than 30 mg/g 11/05/2018    Hyperphosphatemia 05/01/2017    Arteriovenous fistula (HCC)

## 2025-01-30 ENCOUNTER — HOSPITAL ENCOUNTER (OUTPATIENT)
Dept: INFUSION THERAPY | Age: 65
Setting detail: INFUSION SERIES
Discharge: HOME | End: 2025-01-30
Payer: MEDICARE

## 2025-01-30 VITALS
HEART RATE: 60 BPM | RESPIRATION RATE: 16 BRPM | TEMPERATURE: 97.2 F | OXYGEN SATURATION: 96 % | SYSTOLIC BLOOD PRESSURE: 109 MMHG | DIASTOLIC BLOOD PRESSURE: 56 MMHG

## 2025-01-30 DIAGNOSIS — M81.0 AGE-RELATED OSTEOPOROSIS WITHOUT CURRENT PATHOLOGICAL FRACTURE: Primary | ICD-10-CM

## 2025-01-30 PROCEDURE — 96372 THER/PROPH/DIAG INJ SC/IM: CPT

## 2025-01-30 PROCEDURE — 6360000002 HC RX W HCPCS: Performed by: STUDENT IN AN ORGANIZED HEALTH CARE EDUCATION/TRAINING PROGRAM

## 2025-01-30 RX ORDER — DIPHENHYDRAMINE HYDROCHLORIDE 50 MG/ML
50 INJECTION INTRAMUSCULAR; INTRAVENOUS
Status: CANCELLED | OUTPATIENT
Start: 2025-01-30

## 2025-01-30 RX ORDER — HYDROCORTISONE SODIUM SUCCINATE 100 MG/2ML
100 INJECTION INTRAMUSCULAR; INTRAVENOUS
Status: CANCELLED | OUTPATIENT
Start: 2025-01-30

## 2025-01-30 RX ORDER — ACETAMINOPHEN 325 MG/1
650 TABLET ORAL
Status: CANCELLED | OUTPATIENT
Start: 2025-01-30

## 2025-01-30 RX ORDER — SODIUM CHLORIDE 9 MG/ML
INJECTION, SOLUTION INTRAVENOUS CONTINUOUS
Status: CANCELLED | OUTPATIENT
Start: 2025-01-30

## 2025-01-30 RX ORDER — FAMOTIDINE 10 MG/ML
20 INJECTION, SOLUTION INTRAVENOUS
Status: CANCELLED | OUTPATIENT
Start: 2025-01-30

## 2025-01-30 RX ORDER — EPINEPHRINE 1 MG/ML
0.3 INJECTION, SOLUTION INTRAMUSCULAR; SUBCUTANEOUS PRN
Status: CANCELLED | OUTPATIENT
Start: 2025-01-30

## 2025-01-30 RX ORDER — ALBUTEROL SULFATE 90 UG/1
4 INHALANT RESPIRATORY (INHALATION) PRN
Status: CANCELLED | OUTPATIENT
Start: 2025-01-30

## 2025-01-30 RX ORDER — ONDANSETRON 2 MG/ML
8 INJECTION INTRAMUSCULAR; INTRAVENOUS
Status: CANCELLED | OUTPATIENT
Start: 2025-01-30

## 2025-01-30 RX ADMIN — DENOSUMAB 60 MG: 60 INJECTION SUBCUTANEOUS at 11:27

## 2025-01-30 NOTE — DISCHARGE INSTRUCTIONS
Prolia  Increase liquid intake for the next 2-3 days, especially water.  You may experience increase bone pain after injection  Take pain medication as directed   May be sore at injection site.  Call your doctor should you experience jaw pain or new dental complaints  Continue home meds, diet and activity  Call your Doctor for any specific questions or problems.  Prolia injection is given every 6 months    Thank you for choosing [facility]. It is our pleasure to serve you.      Hours 8:00 am - 5:00 pm  Phone Number: [phone]

## 2025-01-30 NOTE — PROGRESS NOTES
Ambulatory to unit room 6 for Prolia.Orientated to unit.Procedure and plan of care explained.Questions answered.Understanding verbalized.Tolerated well.Reviewed discharge instructions, understanding verbalized.Copies of AVS given to take home. Patient discharged home.Down to exit per self.    Orders Placed This Encounter   Medications    denosumab (PROLIA) SC injection 60 mg

## 2025-02-10 ENCOUNTER — TELEPHONE (OUTPATIENT)
Dept: CARDIOLOGY CLINIC | Age: 65
End: 2025-02-10

## 2025-02-21 ENCOUNTER — TELEPHONE (OUTPATIENT)
Dept: CARDIOLOGY CLINIC | Age: 65
End: 2025-02-21

## 2025-02-21 NOTE — TELEPHONE ENCOUNTER
Notified and understood       Echo (TTE) complete     Left Ventricle: Normal left ventricular systolic function with a visually estimated EF of 55 - 60%. Left ventricle size is normal. Mildly increased wall thickness. Normal wall motion. Grade I diastolic dysfunction with normal LAP.    Mitral Valve: Mild regurgitation.    Tricuspid Valve: Mild regurgitation. The estimated RVSP is 33 mmHg.    Left Atrium: Left atrium is mildly dilated.    Pericardium: No pericardial effusion.    Image quality is good.

## 2025-02-27 ENCOUNTER — OFFICE VISIT (OUTPATIENT)
Dept: PSYCHOLOGY | Age: 65
End: 2025-02-27
Payer: MEDICARE

## 2025-02-27 DIAGNOSIS — F33.0 MAJOR DEPRESSIVE DISORDER, RECURRENT EPISODE, MILD: Primary | ICD-10-CM

## 2025-02-27 PROCEDURE — 1123F ACP DISCUSS/DSCN MKR DOCD: CPT | Performed by: PSYCHOLOGIST

## 2025-02-27 PROCEDURE — 90791 PSYCH DIAGNOSTIC EVALUATION: CPT | Performed by: PSYCHOLOGIST

## 2025-02-27 ASSESSMENT — PATIENT HEALTH QUESTIONNAIRE - PHQ9
2. FEELING DOWN, DEPRESSED OR HOPELESS: SEVERAL DAYS
5. POOR APPETITE OR OVEREATING: SEVERAL DAYS
SUM OF ALL RESPONSES TO PHQ QUESTIONS 1-9: 8
4. FEELING TIRED OR HAVING LITTLE ENERGY: SEVERAL DAYS
8. MOVING OR SPEAKING SO SLOWLY THAT OTHER PEOPLE COULD HAVE NOTICED. OR THE OPPOSITE, BEING SO FIGETY OR RESTLESS THAT YOU HAVE BEEN MOVING AROUND A LOT MORE THAN USUAL: NOT AT ALL
6. FEELING BAD ABOUT YOURSELF - OR THAT YOU ARE A FAILURE OR HAVE LET YOURSELF OR YOUR FAMILY DOWN: NOT AT ALL
1. LITTLE INTEREST OR PLEASURE IN DOING THINGS: SEVERAL DAYS
3. TROUBLE FALLING OR STAYING ASLEEP: NEARLY EVERY DAY
SUM OF ALL RESPONSES TO PHQ QUESTIONS 1-9: 8
7. TROUBLE CONCENTRATING ON THINGS, SUCH AS READING THE NEWSPAPER OR WATCHING TELEVISION: SEVERAL DAYS
10. IF YOU CHECKED OFF ANY PROBLEMS, HOW DIFFICULT HAVE THESE PROBLEMS MADE IT FOR YOU TO DO YOUR WORK, TAKE CARE OF THINGS AT HOME, OR GET ALONG WITH OTHER PEOPLE: SOMEWHAT DIFFICULT
9. THOUGHTS THAT YOU WOULD BE BETTER OFF DEAD, OR OF HURTING YOURSELF: NOT AT ALL
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
## 2025-02-28 ENCOUNTER — OFFICE VISIT (OUTPATIENT)
Dept: CARDIOLOGY CLINIC | Age: 65
End: 2025-02-28
Payer: MEDICARE

## 2025-02-28 VITALS
SYSTOLIC BLOOD PRESSURE: 118 MMHG | BODY MASS INDEX: 24.09 KG/M2 | WEIGHT: 127.6 LBS | DIASTOLIC BLOOD PRESSURE: 74 MMHG | HEART RATE: 63 BPM | HEIGHT: 61 IN

## 2025-02-28 DIAGNOSIS — Q61.3 POLYCYSTIC KIDNEY DISEASE: Primary | ICD-10-CM

## 2025-02-28 PROCEDURE — 1159F MED LIST DOCD IN RCRD: CPT | Performed by: INTERNAL MEDICINE

## 2025-02-28 PROCEDURE — 1123F ACP DISCUSS/DSCN MKR DOCD: CPT | Performed by: INTERNAL MEDICINE

## 2025-02-28 PROCEDURE — 99214 OFFICE O/P EST MOD 30 MIN: CPT | Performed by: INTERNAL MEDICINE

## 2025-02-28 NOTE — PATIENT INSTRUCTIONS
Thank you for allowing us to care for you today!   We want to ensure we can follow your treatment plan and we strive to give you the best outcomes and experience possible.   If you ever have a life threatening emergency and call 911 - for an ambulance (EMS)  REMEMBER  Our providers can only care for you at:   [facility] or [facility]   Even if you have someone take you or you drive yourself we can only care for you in a [facility] facility. Our providers are not setup at the other healthcare locations!    PLEASE CALL OUR OFFICE DURING NORMAL BUSINESS HOURS  Monday through Friday 8 am to 5 pm  AFTER HOURS the physician on-call cannot help with scheduling, rescheduling, procedure instruction questions or any type of medication need or issue.  [facility] P:[phone] - [facility] P:[phone] - [facility] P:[phone]      If you receive a survey:  We would appreciate you taking the time to share your experience concerning your provider visit in the office.    These surveys are confidential!  We are eager to improve and are counting on you to share your feedback so we can ensure you get the best care possible.

## 2025-02-28 NOTE — PROGRESS NOTES
CLINICAL STAFF DOCUMENTATION        Glendy RAYMOND Mikki  1960  [id number]    Have you had any Chest Pain recently? - No      Have you had any Shortness of Breath - No    Have you had any dizziness - No      Have you had any palpitations recently? - No      Do you have any edema - swelling in No          When did you have your last labs drawn 1/23/2025  What doctor ordered vern burnett , do   Do we have the labs in their chart Yes          Do you have a surgery or procedure scheduled in the near future - No        Caffeine? - Yes  How much caffeine? .1  Can       
09/08/2022     (L) 09/08/2022     Lab Results   Component Value Date    CHOL 111 01/23/2025    TRIG 98 01/23/2025    HDL 53 01/23/2025    LDLDIRECT 89 06/22/2015    CHOLHDLRATIO 0 09/28/2020     Lab Results   Component Value Date    ALT 10 07/28/2023    AST 9 07/28/2023     BMP:    Lab Results   Component Value Date/Time     10/25/2024 12:00 AM    K 4.3 10/25/2024 12:00 AM     10/25/2024 12:00 AM    CO2 19 10/25/2024 12:00 AM    BUN 19 10/25/2024 12:00 AM    CREATININE 0.85 10/25/2024 12:00 AM     CMP:   Lab Results   Component Value Date/Time     10/25/2024 12:00 AM    K 4.3 10/25/2024 12:00 AM     10/25/2024 12:00 AM    CO2 19 10/25/2024 12:00 AM    BUN 19 10/25/2024 12:00 AM     TSH:    Lab Results   Component Value Date/Time    TSH 2.940 10/25/2024 12:00 AM    TSHHS 0.430 04/25/2017 08:08 AM       Results      Assessment & Plan        Assessment & Plan:    -Has a cardiac murmur will check the echocardiogram    Is Normal 1/25  Last echo in 2018 around the time she had kidney and liver transplant was unremarkable    Also will do a regular treadmill stress test although she is asymptomatic however the incidence of recurrence of coronary artery disease post kidney transplant is about 10 to 30% within the first 5 to 10 years posttransplant which will increase the morbidity and mortality hence we will check into that specially the patient has cardiac risk factors of diabetes which she developed after transplant and hyperlipidemia  And posttransplant diabetes develops in up to 20% of the kidney transplant recipients and accelerates coronary artery disease progression    CAD occurred in 7 to 15% of liver transplant recipients which is a leading cause of known graft related mortality and liver transplant patients  About 60% of the liver transplant received and developed metabolic syndrome due to immunosuppressive therapy which can again cause issues posttransplant hence will check a regular

## 2025-03-03 PROBLEM — Z09 POSTOP CHECK: Status: ACTIVE | Noted: 2025-03-03

## 2025-03-31 ENCOUNTER — OFFICE VISIT (OUTPATIENT)
Dept: PSYCHOLOGY | Age: 65
End: 2025-03-31
Payer: MEDICARE

## 2025-03-31 DIAGNOSIS — F33.0 MAJOR DEPRESSIVE DISORDER, RECURRENT EPISODE, MILD: Primary | ICD-10-CM

## 2025-03-31 PROCEDURE — 1123F ACP DISCUSS/DSCN MKR DOCD: CPT | Performed by: PSYCHOLOGIST

## 2025-03-31 PROCEDURE — 90832 PSYTX W PT 30 MINUTES: CPT | Performed by: PSYCHOLOGIST

## 2025-03-31 NOTE — PROGRESS NOTES
Behavioral Health Consultation  Bryan Carmen Psy.D.  Psychologist      Time spent with Patient: 30 minutes  Visit number: 2  Reason for Consult:  depression  Referring Provider: No referring provider defined for this encounter.    S:  ----------------------------------------------------------------------------------------------------------------------  depression  \"Things have been slow.\" She spent the last week w her family in [place], [place] and has been recovering this weekend. Overall, trip went well. She was unable to ID any \"lows\" from the trip. Mood has been \"pretty good\" and she has been spending time w friends from [place].     Further discussed relationship w daughter and comments made by daughter lucia pt's decision to stay w her father for so long. Pt shared that she is \"more sad than angry now.\" Would like to be able to have a conversation w her daughter and therapist.     O:  ----------------------------------------------------------------------------------------------------------------------  MSE:  Orientation:  oriented to person, place, time, and general circumstances  Appearance and behavior:  alert, cooperative  Speech:  spontaneous, normal rate, and normal volume  Mood: euthymic    Thought Content:  intact  Thought Process:  linear, goal directed, and coherent  Interest/Pleasure: Loss of Pleasure/Fun  Sleep disturbance: Yes  Motivation: Poor  Energy: Tired/Fatigued  Morbid ideation No  Suicide Assessment: no suicidal ideation        2/27/2025    10:09 AM 1/7/2025    10:17 AM 10/1/2024    10:07 AM 3/11/2024     9:32 AM 9/19/2023     2:01 PM 3/31/2022    10:27 AM   PHQ Scores   PHQ2 Score 2 1 0 0 0 0   PHQ9 Score 8 6 0 0 0 3     Interpretation of Total Score Depression Severity: 1-4 = Minimal depression, 5-9 = Mild depression, 10-14 = Moderate depression, 15-19 = Moderately severe depression, 20-27 = Severe depression         No data to display              Interpretation of SHERLYN-7 score: 5-9 =

## 2025-04-02 PROBLEM — Z09 POSTOP CHECK: Status: RESOLVED | Noted: 2025-03-03 | Resolved: 2025-04-02

## 2025-05-19 ENCOUNTER — OFFICE VISIT (OUTPATIENT)
Dept: PSYCHOLOGY | Age: 65
End: 2025-05-19
Payer: MEDICARE

## 2025-05-19 DIAGNOSIS — F33.0 MAJOR DEPRESSIVE DISORDER, RECURRENT EPISODE, MILD: Primary | ICD-10-CM

## 2025-05-19 PROCEDURE — 90832 PSYTX W PT 30 MINUTES: CPT | Performed by: PSYCHOLOGIST

## 2025-05-19 PROCEDURE — 1123F ACP DISCUSS/DSCN MKR DOCD: CPT | Performed by: PSYCHOLOGIST

## 2025-05-19 NOTE — PROGRESS NOTES
referral to behavioral health for scores 10 or greater.      A:  ----------------------------------------------------------------------------------------------------------------------  Diagnosis:    1. Major depressive disorder, recurrent episode, mild      Pt remains stable. Relationship w children has been improving.     P:  ----------------------------------------------------------------------------------------------------------------------    Push out f/u to 8-12 weeks. Include daughter as needed    General:   [x] [place]-setting to identify pt's primary goals for [facility] visit / overall health   [x] When indicated provided general psychoeducation and/or handout on:   1. Major depressive disorder, recurrent episode, mild     [x] Supportive interventions    Cognitive:   [x] Cognitive strategies to target:   1. Major depressive disorder, recurrent episode, mild     [x] Trained and/or reinforced strategies for increasing balanced thinking when indicated     Behavioral:   [x] Discussed and/or reinforced plan for behavioral activation   [x] Motivational Interviewing to increase patient confidence and follow through    [x] Discussed potential barriers to change, if applicable    [x] Discussed and/or reinforced self-care (sleep, nutrition, rewarding activities, social support, exercise) as needed     Other:   []   []   []   []    Recommendations to patient:    1. Return to Dr. Carmen in 2-4 week(s)    2.                  Feedback provided to pt's PCP via EPIC and/or oral report

## 2025-07-25 ENCOUNTER — TELEPHONE (OUTPATIENT)
Dept: FAMILY MEDICINE CLINIC | Age: 65
End: 2025-07-25

## 2025-07-25 ENCOUNTER — OFFICE VISIT (OUTPATIENT)
Dept: FAMILY MEDICINE CLINIC | Age: 65
End: 2025-07-25
Payer: MEDICARE

## 2025-07-25 VITALS
HEIGHT: 61 IN | WEIGHT: 123.1 LBS | DIASTOLIC BLOOD PRESSURE: 74 MMHG | BODY MASS INDEX: 23.24 KG/M2 | OXYGEN SATURATION: 98 % | SYSTOLIC BLOOD PRESSURE: 120 MMHG | HEART RATE: 66 BPM

## 2025-07-25 DIAGNOSIS — F33.41 RECURRENT MAJOR DEPRESSIVE DISORDER, IN PARTIAL REMISSION: ICD-10-CM

## 2025-07-25 DIAGNOSIS — I15.1 HYPERTENSION SECONDARY TO OTHER RENAL DISORDERS: ICD-10-CM

## 2025-07-25 DIAGNOSIS — K59.00 CONSTIPATION, UNSPECIFIED CONSTIPATION TYPE: ICD-10-CM

## 2025-07-25 DIAGNOSIS — M81.6 LOCALIZED OSTEOPOROSIS WITHOUT CURRENT PATHOLOGICAL FRACTURE: ICD-10-CM

## 2025-07-25 DIAGNOSIS — E11.9 TYPE 2 DIABETES MELLITUS WITHOUT COMPLICATION, WITHOUT LONG-TERM CURRENT USE OF INSULIN (HCC): ICD-10-CM

## 2025-07-25 DIAGNOSIS — J30.2 SEASONAL ALLERGIC RHINITIS, UNSPECIFIED TRIGGER: ICD-10-CM

## 2025-07-25 DIAGNOSIS — Z94.4 HISTORY OF LIVER TRANSPLANT (HCC): Primary | ICD-10-CM

## 2025-07-25 LAB
25(OH)D3 SERPL-MCNC: 27.8 NG/ML
EST. AVERAGE GLUCOSE BLD GHB EST-MCNC: 151.3 MG/DL
HBA1C MFR BLD: 6.9 %
PTH-INTACT SERPL-MCNC: 46.4 PG/ML (ref 14–72)
T4 FREE SERPL-MCNC: 1.1 NG/DL (ref 0.9–1.8)
TSH SERPL DL<=0.005 MIU/L-ACNC: 5.15 UIU/ML (ref 0.27–4.2)

## 2025-07-25 PROCEDURE — 1123F ACP DISCUSS/DSCN MKR DOCD: CPT | Performed by: STUDENT IN AN ORGANIZED HEALTH CARE EDUCATION/TRAINING PROGRAM

## 2025-07-25 PROCEDURE — 1159F MED LIST DOCD IN RCRD: CPT | Performed by: STUDENT IN AN ORGANIZED HEALTH CARE EDUCATION/TRAINING PROGRAM

## 2025-07-25 PROCEDURE — 3051F HG A1C>EQUAL 7.0%<8.0%: CPT | Performed by: STUDENT IN AN ORGANIZED HEALTH CARE EDUCATION/TRAINING PROGRAM

## 2025-07-25 PROCEDURE — 99214 OFFICE O/P EST MOD 30 MIN: CPT | Performed by: STUDENT IN AN ORGANIZED HEALTH CARE EDUCATION/TRAINING PROGRAM

## 2025-07-25 RX ORDER — PSEUDOEPHEDRINE HCL 30 MG
100 TABLET ORAL DAILY
Qty: 90 CAPSULE | Refills: 1 | Status: SHIPPED | OUTPATIENT
Start: 2025-07-25

## 2025-07-25 RX ORDER — MONTELUKAST SODIUM 10 MG/1
10 TABLET ORAL DAILY
Qty: 90 TABLET | Refills: 1 | Status: SHIPPED | OUTPATIENT
Start: 2025-07-25

## 2025-07-25 RX ORDER — UBIQUINOL 100 MG
1 CAPSULE ORAL 2 TIMES DAILY
Qty: 180 EACH | Refills: 1 | Status: SHIPPED | OUTPATIENT
Start: 2025-07-25 | End: 2026-01-21

## 2025-07-25 SDOH — ECONOMIC STABILITY: FOOD INSECURITY: WITHIN THE PAST 12 MONTHS, THE FOOD YOU BOUGHT JUST DIDN'T LAST AND YOU DIDN'T HAVE MONEY TO GET MORE.: NEVER TRUE

## 2025-07-25 SDOH — ECONOMIC STABILITY: FOOD INSECURITY: WITHIN THE PAST 12 MONTHS, YOU WORRIED THAT YOUR FOOD WOULD RUN OUT BEFORE YOU GOT MONEY TO BUY MORE.: NEVER TRUE

## 2025-07-25 ASSESSMENT — ENCOUNTER SYMPTOMS
WHEEZING: 0
SORE THROAT: 0
ABDOMINAL PAIN: 0
SHORTNESS OF BREATH: 0
NAUSEA: 0

## 2025-07-25 NOTE — PROGRESS NOTES
7/25/2025    Glendy Kaye    Chief Complaint   Patient presents with    6 Month Follow-Up       HPI  History of Present Illness  The patient presents for evaluation of diabetes, osteopenia, and health maintenance.    She reports no episodes of low blood sugar. She is currently on Trulicity and has not experienced any constipation or nausea as side effects. Blood work was done at [facility] last week, which did not include an A1c test as initially thought. She is scheduled to see Dr. Restrepo in December 2025 and was advised to have blood work done in the last week of November 2025.    She is up to date with her vaccinations, including the RSV vaccine. She plans to receive the Tdap vaccine at her local pharmacy. No issues with leg swelling or abnormal skin lesions or moles are reported. She sees a dermatologist annually, as recommended after her transplant.    She is using Prolia and continues to take vitamin D supplements. She has been on Prolia since her transplant, prescribed by Dr. Rebollar. She receives her Prolia injections at [facility] and has informed her dentist about her Prolia use. She had a DEXA scan when she first started the medication. She had osteoporosis levels in the past but not anymore. She discontinued Prolia due to insurance issues and had a test after 6 months, which showed a return to bad levels.    She is doing well on sertraline and wishes to continue it.      1. History of liver transplant (HCC)    2. Type 2 diabetes mellitus without complication, without long-term current use of insulin (HCC)    3. Hypertension secondary to other renal disorders    4. Constipation, unspecified constipation type    5. Seasonal allergic rhinitis, unspecified trigger    6. Recurrent major depressive disorder, in partial remission    7. Localized osteoporosis without current pathological fracture             REVIEW OF SYMPTOMS    Review of Systems   Constitutional:  Negative for chills

## 2025-07-28 RX ORDER — ALBUTEROL SULFATE 90 UG/1
4 INHALANT RESPIRATORY (INHALATION) PRN
Status: CANCELLED | OUTPATIENT
Start: 2025-07-31

## 2025-07-28 RX ORDER — DIPHENHYDRAMINE HYDROCHLORIDE 50 MG/ML
50 INJECTION, SOLUTION INTRAMUSCULAR; INTRAVENOUS
Status: CANCELLED | OUTPATIENT
Start: 2025-07-31

## 2025-07-28 RX ORDER — EPINEPHRINE 1 MG/ML
0.3 INJECTION, SOLUTION INTRAMUSCULAR; SUBCUTANEOUS PRN
Status: CANCELLED | OUTPATIENT
Start: 2025-07-31

## 2025-07-28 RX ORDER — ACETAMINOPHEN 325 MG/1
650 TABLET ORAL
Status: CANCELLED | OUTPATIENT
Start: 2025-07-31

## 2025-07-28 RX ORDER — FAMOTIDINE 10 MG/ML
20 INJECTION, SOLUTION INTRAVENOUS
Status: CANCELLED | OUTPATIENT
Start: 2025-07-31

## 2025-07-28 RX ORDER — ONDANSETRON 2 MG/ML
8 INJECTION INTRAMUSCULAR; INTRAVENOUS
Status: CANCELLED | OUTPATIENT
Start: 2025-07-31

## 2025-07-28 RX ORDER — SODIUM CHLORIDE 9 MG/ML
INJECTION, SOLUTION INTRAVENOUS PRN
Status: CANCELLED | OUTPATIENT
Start: 2025-07-31

## 2025-07-28 RX ORDER — HYDROCORTISONE SODIUM SUCCINATE 100 MG/2ML
100 INJECTION INTRAMUSCULAR; INTRAVENOUS
Status: CANCELLED | OUTPATIENT
Start: 2025-07-31

## 2025-07-30 ENCOUNTER — RESULTS FOLLOW-UP (OUTPATIENT)
Dept: FAMILY MEDICINE CLINIC | Age: 65
End: 2025-07-30

## 2025-07-30 NOTE — RESULT ENCOUNTER NOTE
Beata's labs show low levels of vit D. Recommend we add 2000 IU in addition to the ca vit D supplement she already takes. We can Rx or she can obtain OTC

## 2025-07-30 NOTE — TELEPHONE ENCOUNTER
Beata returned call, discussed result note. Only question was what A1c level was. Informed patient. Verbal understanding. No further questions or concerns

## 2025-07-31 ENCOUNTER — HOSPITAL ENCOUNTER (OUTPATIENT)
Dept: INFUSION THERAPY | Age: 65
Setting detail: INFUSION SERIES
Discharge: HOME | End: 2025-07-31
Payer: MEDICARE

## 2025-07-31 VITALS
RESPIRATION RATE: 16 BRPM | SYSTOLIC BLOOD PRESSURE: 123 MMHG | OXYGEN SATURATION: 98 % | HEART RATE: 55 BPM | DIASTOLIC BLOOD PRESSURE: 77 MMHG

## 2025-07-31 DIAGNOSIS — M81.0 AGE-RELATED OSTEOPOROSIS WITHOUT CURRENT PATHOLOGICAL FRACTURE: Primary | ICD-10-CM

## 2025-07-31 PROCEDURE — 6360000002 HC RX W HCPCS: Performed by: STUDENT IN AN ORGANIZED HEALTH CARE EDUCATION/TRAINING PROGRAM

## 2025-07-31 PROCEDURE — 96372 THER/PROPH/DIAG INJ SC/IM: CPT

## 2025-07-31 RX ORDER — HYDROCORTISONE SODIUM SUCCINATE 100 MG/2ML
100 INJECTION INTRAMUSCULAR; INTRAVENOUS
OUTPATIENT
Start: 2026-01-29

## 2025-07-31 RX ORDER — SODIUM CHLORIDE 9 MG/ML
INJECTION, SOLUTION INTRAVENOUS PRN
OUTPATIENT
Start: 2026-01-29

## 2025-07-31 RX ORDER — DIPHENHYDRAMINE HYDROCHLORIDE 50 MG/ML
50 INJECTION, SOLUTION INTRAMUSCULAR; INTRAVENOUS
OUTPATIENT
Start: 2026-01-29

## 2025-07-31 RX ORDER — ONDANSETRON 2 MG/ML
8 INJECTION INTRAMUSCULAR; INTRAVENOUS
OUTPATIENT
Start: 2026-01-29

## 2025-07-31 RX ORDER — EPINEPHRINE 1 MG/ML
0.3 INJECTION, SOLUTION INTRAMUSCULAR; SUBCUTANEOUS PRN
OUTPATIENT
Start: 2026-01-29

## 2025-07-31 RX ORDER — ACETAMINOPHEN 325 MG/1
650 TABLET ORAL
OUTPATIENT
Start: 2026-01-29

## 2025-07-31 RX ORDER — ALBUTEROL SULFATE 90 UG/1
4 INHALANT RESPIRATORY (INHALATION) PRN
OUTPATIENT
Start: 2026-01-29

## 2025-07-31 RX ORDER — FAMOTIDINE 10 MG/ML
20 INJECTION, SOLUTION INTRAVENOUS
OUTPATIENT
Start: 2026-01-29

## 2025-07-31 RX ADMIN — DENOSUMAB 60 MG: 60 INJECTION SUBCUTANEOUS at 11:11

## (undated) DEVICE — ENDOSCOPY KIT: Brand: MEDLINE INDUSTRIES, INC.